# Patient Record
Sex: FEMALE | Race: WHITE | HISPANIC OR LATINO | Employment: UNEMPLOYED | URBAN - METROPOLITAN AREA
[De-identification: names, ages, dates, MRNs, and addresses within clinical notes are randomized per-mention and may not be internally consistent; named-entity substitution may affect disease eponyms.]

---

## 2022-05-16 ENCOUNTER — OFFICE VISIT (OUTPATIENT)
Dept: PRIMARY CARE CLINIC | Facility: CLINIC | Age: 57
End: 2022-05-16
Payer: COMMERCIAL

## 2022-05-16 VITALS
OXYGEN SATURATION: 98 % | HEIGHT: 62 IN | BODY MASS INDEX: 22.82 KG/M2 | HEART RATE: 73 BPM | RESPIRATION RATE: 18 BRPM | SYSTOLIC BLOOD PRESSURE: 112 MMHG | DIASTOLIC BLOOD PRESSURE: 70 MMHG | WEIGHT: 124 LBS

## 2022-05-16 DIAGNOSIS — Z01.419 WELL WOMAN EXAM WITH ROUTINE GYNECOLOGICAL EXAM: ICD-10-CM

## 2022-05-16 DIAGNOSIS — M25.59 PAIN IN OTHER JOINT: ICD-10-CM

## 2022-05-16 DIAGNOSIS — Z00.00 ANNUAL PHYSICAL EXAM: ICD-10-CM

## 2022-05-16 DIAGNOSIS — Z12.31 BREAST CANCER SCREENING BY MAMMOGRAM: ICD-10-CM

## 2022-05-16 DIAGNOSIS — R53.83 OTHER FATIGUE: ICD-10-CM

## 2022-05-16 DIAGNOSIS — M25.40 JOINT SWELLING: ICD-10-CM

## 2022-05-16 PROCEDURE — 1160F RVW MEDS BY RX/DR IN RCRD: CPT | Mod: CPTII,S$GLB,, | Performed by: NURSE PRACTITIONER

## 2022-05-16 PROCEDURE — 1159F PR MEDICATION LIST DOCUMENTED IN MEDICAL RECORD: ICD-10-PCS | Mod: CPTII,S$GLB,, | Performed by: NURSE PRACTITIONER

## 2022-05-16 PROCEDURE — 3078F DIAST BP <80 MM HG: CPT | Mod: CPTII,S$GLB,, | Performed by: NURSE PRACTITIONER

## 2022-05-16 PROCEDURE — 3078F PR MOST RECENT DIASTOLIC BLOOD PRESSURE < 80 MM HG: ICD-10-PCS | Mod: CPTII,S$GLB,, | Performed by: NURSE PRACTITIONER

## 2022-05-16 PROCEDURE — 3074F PR MOST RECENT SYSTOLIC BLOOD PRESSURE < 130 MM HG: ICD-10-PCS | Mod: CPTII,S$GLB,, | Performed by: NURSE PRACTITIONER

## 2022-05-16 PROCEDURE — 3074F SYST BP LT 130 MM HG: CPT | Mod: CPTII,S$GLB,, | Performed by: NURSE PRACTITIONER

## 2022-05-16 PROCEDURE — 1160F PR REVIEW ALL MEDS BY PRESCRIBER/CLIN PHARMACIST DOCUMENTED: ICD-10-PCS | Mod: CPTII,S$GLB,, | Performed by: NURSE PRACTITIONER

## 2022-05-16 PROCEDURE — 3008F PR BODY MASS INDEX (BMI) DOCUMENTED: ICD-10-PCS | Mod: CPTII,S$GLB,, | Performed by: NURSE PRACTITIONER

## 2022-05-16 PROCEDURE — 1159F MED LIST DOCD IN RCRD: CPT | Mod: CPTII,S$GLB,, | Performed by: NURSE PRACTITIONER

## 2022-05-16 PROCEDURE — 99204 PR OFFICE/OUTPT VISIT, NEW, LEVL IV, 45-59 MIN: ICD-10-PCS | Mod: S$GLB,,, | Performed by: NURSE PRACTITIONER

## 2022-05-16 PROCEDURE — 99204 OFFICE O/P NEW MOD 45 MIN: CPT | Mod: S$GLB,,, | Performed by: NURSE PRACTITIONER

## 2022-05-16 PROCEDURE — 3008F BODY MASS INDEX DOCD: CPT | Mod: CPTII,S$GLB,, | Performed by: NURSE PRACTITIONER

## 2022-05-16 RX ORDER — ATORVASTATIN CALCIUM 20 MG/1
20 TABLET, FILM COATED ORAL NIGHTLY
COMMUNITY
Start: 2022-03-10 | End: 2022-06-01 | Stop reason: SDUPTHER

## 2022-05-16 NOTE — PROGRESS NOTES
Subjective:       Patient ID: Shima Wood is a 56 y.o. female.    Chief Complaint: Establish Care    HPI: Shima is a 56 y.o. female  who presents to establish care with a new PCP. Previously seen by Dr. Demarco.     C/O being achy almost every morning. Aches to her hands, feet and hips. 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    History of Hyperlipidemia usually controlled with atorvastatin 20 mg daily. She has been trying to eat healthier but does not exercise.    Due for mammogram, last one done 2 years ago and was normal.    She has been in menopause for 6 years. Last PAP smear done 2 years ago by provider at [facility] and was normal.    Had a Colonoscopy done last year at [facility] and had some polyps removed and told to repeat in 3 years.    Denies smoking or drinking.     UTD with COVID vaccine.        Past Medical History:   Diagnosis Date    Hyperlipidemia        History reviewed. No pertinent surgical history.    History reviewed. No pertinent family history.    Social History     Tobacco Use    Smoking status: Never Smoker    Smokeless tobacco: Never Used       There is no problem list on file for this patient.      Immunization History   Administered Date(s) Administered    COVID-19, MRNA, LN-S, PF (MODERNA FULL 0.5 ML DOSE) 03/30/2021, 04/27/2021, 04/12/2022           Review of Systems   Constitutional: Positive for fatigue. Negative for activity change, appetite change, chills, diaphoresis and fever.   HENT: Negative for congestion, ear pain, sinus pain, tinnitus and trouble swallowing.    Eyes: Negative for pain and visual disturbance.   Respiratory: Negative for cough, chest tightness, shortness of breath and wheezing.    Cardiovascular: Negative for chest pain, palpitations and leg swelling.   Gastrointestinal: Negative for abdominal distention, abdominal pain, blood in stool, constipation, diarrhea, nausea and vomiting.   Endocrine: Negative for  "cold intolerance, heat intolerance, polydipsia, polyphagia and polyuria.   Genitourinary: Negative for decreased urine volume, dysuria, frequency, hematuria and urgency.   Musculoskeletal: Positive for arthralgias. Negative for back pain, gait problem, joint swelling, neck pain and neck stiffness.   Skin: Negative for color change, pallor and rash.   Neurological: Negative for dizziness, syncope, weakness, light-headedness, numbness and headaches.   Hematological: Negative for adenopathy. Does not bruise/bleed easily.   Psychiatric/Behavioral: Negative for behavioral problems, confusion and dysphoric mood. The patient is not nervous/anxious.      Objective:     Vitals:    05/16/22 1047   BP: 112/70   BP Location: Left arm   Patient Position: Sitting   BP Method: Large (Automatic)   Pulse: 73   Resp: 18   SpO2: 98%   Weight: 56.2 kg (124 lb)   Height: 5' 2" (1.575 m)       Physical Exam  Vitals and nursing note reviewed.   Constitutional:       General: She is not in acute distress.     Appearance: Normal appearance. She is not ill-appearing.   HENT:      Head: Normocephalic and atraumatic.      Nose: Nose normal.      Mouth/Throat:      Mouth: Mucous membranes are moist.      Pharynx: Oropharynx is clear.   Eyes:      Extraocular Movements: Extraocular movements intact.      Conjunctiva/sclera: Conjunctivae normal.      Pupils: Pupils are equal, round, and reactive to light.   Neck:      Thyroid: No thyromegaly or thyroid tenderness.   Cardiovascular:      Rate and Rhythm: Normal rate and regular rhythm.      Pulses: Normal pulses.      Heart sounds: Normal heart sounds.   Pulmonary:      Effort: Pulmonary effort is normal.      Breath sounds: Normal breath sounds. No stridor. No wheezing or rales.   Abdominal:      General: Bowel sounds are normal. There is no distension.      Palpations: Abdomen is soft.      Tenderness: There is no abdominal tenderness.   Musculoskeletal:         General: Tenderness (multiple " joints) present. Normal range of motion.      Cervical back: Normal range of motion and neck supple.      Right lower leg: No edema.      Left lower leg: No edema.   Lymphadenopathy:      Cervical: No cervical adenopathy.   Skin:     General: Skin is warm and dry.      Capillary Refill: Capillary refill takes less than 2 seconds.   Neurological:      General: No focal deficit present.      Mental Status: She is alert and oriented to person, place, and time.   Psychiatric:         Mood and Affect: Mood and affect normal.         Behavior: Behavior normal. Behavior is cooperative.         Thought Content: Thought content normal.         Judgment: Judgment normal.         No visits with results within 6 Month(s) from this visit.   Latest known visit with results is:   No results found for any previous visit.         Assessment:      1. Annual physical exam    2. Breast cancer screening by mammogram    3. Well woman exam with routine gynecological exam    4. Pain in other joint    5. Joint swelling    6. Other fatigue          Plan:     Annual physical exam  Comments:  Will review labs and determine POC based  Orders:  -     Comprehensive Metabolic Panel; Future; Expected date: 05/16/2022  -     CBC auto differential; Future; Expected date: 05/16/2022  -     TSH; Future; Expected date: 05/16/2022  -     Lipid Panel; Future; Expected date: 05/16/2022    Breast cancer screening by mammogram  -     Mammo Digital Screening Bilat; Future; Expected date: 05/16/2022    Well woman exam with routine gynecological exam  -     Ambulatory referral/consult to Obstetrics / Gynecology; Future; Expected date: 05/23/2022    Pain in other joint  -     BIENVENIDO Screen w/Reflex; Future; Expected date: 05/16/2022  -     Rheumatoid Factor; Future; Expected date: 05/16/2022    Joint swelling  -     BIENVENIDO Screen w/Reflex; Future; Expected date: 05/16/2022  -     Rheumatoid Factor; Future; Expected date: 05/16/2022    Other fatigue  -     BIENVENIDO Screen  w/Reflex; Future; Expected date: 05/16/2022  -     Rheumatoid Factor; Future; Expected date: 05/16/2022         Current Outpatient Medications   Medication Sig Dispense Refill    atorvastatin (LIPITOR) 20 MG tablet Take 20 mg by mouth every evening.       No current facility-administered medications for this visit.       There are no discontinued medications.    Health Maintenance   Topic Date Due    Hepatitis C Screening  Never done    Lipid Panel  Never done    TETANUS VACCINE  Never done    Mammogram  Never done       Patient Instructions   RTC in 2 weeks for F/U or sooner if needed.    Keep appts with specialists as scheduled.    Instructed patient to report to nearest ER or call 911 if begins to have difficulty breathing, turning blue, chest pain, B/P < 80/60 or >170/100, palpitations, syncope, extreme weakness, or severe H/A. Patient verbalized understanding.      Patient Education       Low Cholesterol, Saturated Fat, and Trans Fat Diet   About this topic   Cholesterol, saturated fat, and trans fat are in many foods. These may raise your blood cholesterol levels. If your cholesterol is too high, this can cause health problems in your heart, liver, kidneys, and even your eyes. The key to lowering your risk of heart problems is to lower your bad fat intake.  Saturated fats and trans fats are the bad fats. These fats clog your arteries and raise your bad cholesterol. Saturated fats and trans fats are solid fats at room temperature. Saturated fats are animal fats. Trans fats are manmade fats. They add flavor to a lot of packaged foods. Staying away from saturated and trans fats will help your heart.  When you do eat foods with fat, make sure they have the good fats. Monounsaturated and polyunsaturated fats are good fats. These fats help raise your good cholesterol and protect your heart.  General   How to Lower Fat and Cholesterol in Your Diet   · Read the labels of the foods you buy from the market to find  "out how much fat is present. Under 5% of total fat on a label means it is "low fat". Over 20% of total fat on a label means it is high fat.  · Eat high fiber foods, like soluble fiber. This type of fiber helps lower cholesterol in the body. Choose oatmeal, fruits (like apples), beans, and nuts to get the most soluble fiber.  · Eat foods high in omega-3 fatty acids like toshia seeds, walnuts, salmon, tuna, trout, herring, flaxseed, and soybeans. These foods help keep the heart healthy.  · Limit your bad fat and oil intake.  · Stay away from butter, stick margarine, shortening, lard, and palm and coconut oil. Pick plant-based spreads instead.  · Limit mayonnaise, salad dressings, gravies, and sauces, unless it is made from low-fat ingredients.  · Limit chocolate.  · Do not eat high-fat processed foods like hot dogs, newell, sausage, ham and other luncheon meats high in fat, and some frozen foods. Pick fish, chicken, turkey, and lean meats instead.  · Eat more dried beans, lentils, and tofu to get your protein.  · Do not eat organ meats, like liver.  · Choose nonfat or low-fat milk, yogurt, and cheese.  · Use light or fat-free cream cheese and sour cream.  · Eat lots of fruits and vegetables.  · Pick whole grain breads, cereals, pastas, and rice.  · Do not eat snacks that are high in fats like granola, cookies, pies, pastries, doughnuts, and croissants.  · Stay away from deep fried foods.  Help When Cooking   · Remove the fat portion of meats and the skin from poultry before cooking.  · Bake, broil, grill, poach, or roast poultry, fish, and lean meats.  · Drain and throw away the fat that drains out of meat as you cook it.  · Try to add little or no fat to foods.  · Use olive or canola oil for cooking or baking.  · Steam your vegetables.  · Use herbs or no-oil marinades to flavor foods.         Who should use this diet?   This diet is for people who are at high risk of getting health problems like heart disease, high " blood pressure, diabetes, and others. This diet is also good for all people to follow to keep your heart healthy.  What foods are good to eat?   Foods with good fats are:  · Canola, peanut, and olive oil  · Safflower, soybean, and corn oil  · Walnuts, almonds, cashews, and peanuts  · Pumpkin and sunflower seeds  · [place] and tuna  · Tofu  · Soymilk  · Avocado  What foods should be limited or avoided?   Stay away from these types of foods that have saturated fats:  · Whole fat dairy products like cheese, ice cream, whole milk, and cream  · Palm and coconut oils  · High-fat meats like beef, lamb, poultry with the skin, newell, and sausage  · Butter and lard  Stay away from these types of foods that may have trans fat:  · Cookies, cakes, candy, doughnuts, baked goods, muffins, pizza dough, and pie crusts that are packaged  · Fried foods  · Frozen dinners  · Chips and crackers  · Microwave popcorn  · Stick margarine and vegetable shortenings  Helpful tips   To help stay away from saturated fat:  · Pick lean cuts of meat  · Take the skin off chicken and turkey or pick skinless  · Pick low-fat cheese, milk, and ice cream  · Use liquid oils when cooking and baking, such as olive oil and canola oil  To help stay away from trans fat:  · Look at your labels. Choose foods with 0% trans fat. Read the ingredient list. Avoid foods with partially hydrogenated oil in the ingredient list. This means there is trans fat in the product.  Where can I learn more?   American Heart Association   http://www.heart.org/HEARTORG/Conditions/Cholesterol/PreventionTreatmentofHighCholesterol/Know-Your-Fats_[facility]_305628_Article.jsp   Last Reviewed Date   2021-10-05  Consumer Information Use and Disclaimer   This information is not specific medical advice and does not replace information you receive from your health care provider. This is only a brief summary of general information. It does NOT include all information about conditions, illnesses,  injuries, tests, procedures, treatments, therapies, discharge instructions or life-style choices that may apply to you. You must talk with your health care provider for complete information about your health and treatment options. This information should not be used to decide whether or not to accept your health care providers advice, instructions or recommendations. Only your health care provider has the knowledge and training to provide advice that is right for you.   Copyright   Copyright © 2021 UpToDate, Inc. and its affiliates and/or licensors. All rights reserved.  Patient Education       Yearly Physical for Adults   About this topic   Most people do not want to be sick. Having a checkup each year with your doctor is one way to help you stay healthy. You may need to see your doctor more or less often. How often you need to go to the doctor depends on your age. Your family and medical history also play a role in how often you need to go to the doctor. Going to see your doctor on a routine basis can help you find problems early or even before they start. This may make it easier to treat or cure your problem.  General   Your doctor will talk about many things during your checkup. Your doctor may ask about:  · Your medical and family history.  · All the drugs you are taking. Be sure to include all prescription, over the counter, and herbal supplements. Tell the doctor if you have any drug allergy. Bring a list of drugs you take with you.  · How you are feeling and if you are having any problems.  · Risky behaviors like smoking, drinking alcohol, using illegal drugs, not wearing seatbelts, having unprotected sex, etc.  Your doctor will do a physical exam and may check your:  · Height and weight  · Blood pressure  · Reflexes  · Memory  · Vision  · Hearing  Your doctor may order:  · Lab tests  · ECG to check your heart rhythm  · X-rays  · Tests or treatments based on your exam  What lifestyle changes are needed?    Your doctor may suggest you make changes to your lifestyle at this visit. The doctor may talk with you about being more active or lowering stress levels. Ask your doctor what you need to do.  What drugs may be needed?   Your doctor may order drugs or vaccines to protect you from illnesses.  What changes to diet are needed?   Talk to your doctor to see if any changes are needed to your diet.  When do I need to call the doctor?   Call your doctor if you need to learn about any test results. Together you can make a plan for more care.  Helpful tips   · Make a list of questions for your doctor before you go. This will help you remember to ask about any concerns. Write down any answers from your doctor so you can look over them after your visit.   · Tell your doctor about any changes in your body or health since your last visit.  · Ask your doctor about any screening tests you need.  Where can I learn more?   American Academy of [employer]icians  http://familydoctor.org/familydoctor/en/prevention-wellness/staying-healthy/healthy-living/preventive-services-for-healthy-living.printerview.html   Centers for Disease Control  http://www.cdc.gov/family/checkup/   Last Reviewed Date   2019-04-22  Consumer Information Use and Disclaimer   This information is not specific medical advice and does not replace information you receive from your health care provider. This is only a brief summary of general information. It does NOT include all information about conditions, illnesses, injuries, tests, procedures, treatments, therapies, discharge instructions or life-style choices that may apply to you. You must talk with your health care provider for complete information about your health and treatment options. This information should not be used to decide whether or not to accept your health care providers advice, instructions or recommendations. Only your health care provider has the knowledge and training to provide advice that  is right for you.  Copyright   Copyright © 2021 [employer], [employer]. and its affiliates and/or licensors. All rights reserved.        Risks, benefits, and alternatives discussed with patient, Patient verbalized understanding of discussed plan of care. Asked patient if any further questions, answered no.    Future Appointments   Date Time Provider Department Center   6/1/2022 11:40 AM Yenifer Stein NP [facility]              Yenifer Stein NP

## 2022-05-16 NOTE — PATIENT INSTRUCTIONS
"RTC in 2 weeks for F/U or sooner if needed.    Keep appts with specialists as scheduled.    Instructed patient to report to nearest ER or call 911 if begins to have difficulty breathing, turning blue, chest pain, B/P < 80/60 or >170/100, palpitations, syncope, extreme weakness, or severe H/A. Patient verbalized understanding.      Patient Education       Low Cholesterol, Saturated Fat, and Trans Fat Diet   About this topic   Cholesterol, saturated fat, and trans fat are in many foods. These may raise your blood cholesterol levels. If your cholesterol is too high, this can cause health problems in your heart, liver, kidneys, and even your eyes. The key to lowering your risk of heart problems is to lower your bad fat intake.  Saturated fats and trans fats are the bad fats. These fats clog your arteries and raise your bad cholesterol. Saturated fats and trans fats are solid fats at room temperature. Saturated fats are animal fats. Trans fats are manmade fats. They add flavor to a lot of packaged foods. Staying away from saturated and trans fats will help your heart.  When you do eat foods with fat, make sure they have the good fats. Monounsaturated and polyunsaturated fats are good fats. These fats help raise your good cholesterol and protect your heart.  General   How to Lower Fat and Cholesterol in Your Diet   Read the labels of the foods you buy from the market to find out how much fat is present. Under 5% of total fat on a label means it is "low fat". Over 20% of total fat on a label means it is high fat.  Eat high fiber foods, like soluble fiber. This type of fiber helps lower cholesterol in the body. Choose oatmeal, fruits (like apples), beans, and nuts to get the most soluble fiber.  Eat foods high in omega-3 fatty acids like toshia seeds, walnuts, salmon, tuna, trout, herring, flaxseed, and soybeans. These foods help keep the heart healthy.  Limit your bad fat and oil intake.  Stay away from butter, stick " margarine, shortening, lard, and palm and coconut oil. Pick plant-based spreads instead.  Limit mayonnaise, salad dressings, gravies, and sauces, unless it is made from low-fat ingredients.  Limit chocolate.  Do not eat high-fat processed foods like hot dogs, newell, sausage, ham and other luncheon meats high in fat, and some frozen foods. Pick fish, chicken, turkey, and lean meats instead.  Eat more dried beans, lentils, and tofu to get your protein.  Do not eat organ meats, like liver.  Choose nonfat or low-fat milk, yogurt, and cheese.  Use light or fat-free cream cheese and sour cream.  Eat lots of fruits and vegetables.  Pick whole grain breads, cereals, pastas, and rice.  Do not eat snacks that are high in fats like granola, cookies, pies, pastries, doughnuts, and croissants.  Stay away from deep fried foods.  Help When Cooking   Remove the fat portion of meats and the skin from poultry before cooking.  Bake, broil, grill, poach, or roast poultry, fish, and lean meats.  Drain and throw away the fat that drains out of meat as you cook it.  Try to add little or no fat to foods.  Use olive or canola oil for cooking or baking.  Steam your vegetables.  Use herbs or no-oil marinades to flavor foods.         Who should use this diet?   This diet is for people who are at high risk of getting health problems like heart disease, high blood pressure, diabetes, and others. This diet is also good for all people to follow to keep your heart healthy.  What foods are good to eat?   Foods with good fats are:  Canola, peanut, and olive oil  Safflower, soybean, and corn oil  Walnuts, almonds, cashews, and peanuts  Pumpkin and sunflower seeds  [place] and tuna  Tofu  Soymilk  Avocado  What foods should be limited or avoided?   Stay away from these types of foods that have saturated fats:  Whole fat dairy products like cheese, ice cream, whole milk, and cream  Palm and coconut oils  High-fat meats like beef, lamb, poultry with the  skin, newell, and sausage  Butter and lard  Stay away from these types of foods that may have trans fat:  Cookies, cakes, candy, doughnuts, baked goods, muffins, pizza dough, and pie crusts that are packaged  Fried foods  Frozen dinners  Chips and crackers  Microwave popcorn  Stick margarine and vegetable shortenings  Helpful tips   To help stay away from saturated fat:  Pick lean cuts of meat  Take the skin off chicken and turkey or pick skinless  Pick low-fat cheese, milk, and ice cream  Use liquid oils when cooking and baking, such as olive oil and canola oil  To help stay away from trans fat:  Look at your labels. Choose foods with 0% trans fat. Read the ingredient list. Avoid foods with partially hydrogenated oil in the ingredient list. This means there is trans fat in the product.  Where can I learn more?   American Heart Association   http://www.heart.org/HEARTORG/Conditions/Cholesterol/PreventionTreatmentofHighCholesterol/Know-Your-Fats_[email or web address]_305628_Article.jsp   Last Reviewed Date   2021-10-05  Consumer Information Use and Disclaimer   This information is not specific medical advice and does not replace information you receive from your health care provider. This is only a brief summary of general information. It does NOT include all information about conditions, illnesses, injuries, tests, procedures, treatments, therapies, discharge instructions or life-style choices that may apply to you. You must talk with your health care provider for complete information about your health and treatment options. This information should not be used to decide whether or not to accept your health care providers advice, instructions or recommendations. Only your health care provider has the knowledge and training to provide advice that is right for you.   Copyright   Copyright © 2021 [employer] and its affiliates and/or licensors. All rights reserved.  Patient Education       Yearly Physical for Adults   About this topic    Most people do not want to be sick. Having a checkup each year with your doctor is one way to help you stay healthy. You may need to see your doctor more or less often. How often you need to go to the doctor depends on your age. Your family and medical history also play a role in how often you need to go to the doctor. Going to see your doctor on a routine basis can help you find problems early or even before they start. This may make it easier to treat or cure your problem.  General   Your doctor will talk about many things during your checkup. Your doctor may ask about:  Your medical and family history.  All the drugs you are taking. Be sure to include all prescription, over the counter, and herbal supplements. Tell the doctor if you have any drug allergy. Bring a list of drugs you take with you.  How you are feeling and if you are having any problems.  Risky behaviors like smoking, drinking alcohol, using illegal drugs, not wearing seatbelts, having unprotected sex, etc.  Your doctor will do a physical exam and may check your:  Height and weight  Blood pressure  Reflexes  Memory  Vision  Hearing  Your doctor may order:  Lab tests  ECG to check your heart rhythm  X-rays  Tests or treatments based on your exam  What lifestyle changes are needed?   Your doctor may suggest you make changes to your lifestyle at this visit. The doctor may talk with you about being more active or lowering stress levels. Ask your doctor what you need to do.  What drugs may be needed?   Your doctor may order drugs or vaccines to protect you from illnesses.  What changes to diet are needed?   Talk to your doctor to see if any changes are needed to your diet.  When do I need to call the doctor?   Call your doctor if you need to learn about any test results. Together you can make a plan for more care.  Helpful tips   Make a list of questions for your doctor before you go. This will help you remember to ask about any concerns. Write down  any answers from your doctor so you can look over them after your visit.   Tell your doctor about any changes in your body or health since your last visit.  Ask your doctor about any screening tests you need.  Where can I learn more?   American Academy [employer]  http://familydoctor.org/familydoctor/en/prevention-wellness/staying-healthy/healthy-living/preventive-services-for-healthy-living.printerview.html   Centers for Disease Control  http://www.cdc.gov/family/checkup/   Last Reviewed Date   2019-04-22  Consumer Information Use and Disclaimer   This information is not specific medical advice and does not replace information you receive from your health care provider. This is only a brief summary of general information. It does NOT include all information about conditions, illnesses, injuries, tests, procedures, treatments, therapies, discharge instructions or life-style choices that may apply to you. You must talk with your health care provider for complete information about your health and treatment options. This information should not be used to decide whether or not to accept your health care providers advice, instructions or recommendations. Only your health care provider has the knowledge and training to provide advice that is right for you.  Copyright   Copyright © 2021 [employer] and its affiliates and/or licensors. All rights reserved.

## 2022-05-17 ENCOUNTER — PATIENT MESSAGE (OUTPATIENT)
Dept: ADMINISTRATIVE | Facility: HOSPITAL | Age: 57
End: 2022-05-17
Payer: COMMERCIAL

## 2022-05-17 LAB
ALBUMIN SERPL-MCNC: 4.6 G/DL (ref 3.6–5.1)
ALBUMIN/GLOB SERPL: 1.8 (CALC) (ref 1–2.5)
ALP SERPL-CCNC: 76 U/L (ref 37–153)
ALT SERPL-CCNC: 29 U/L (ref 6–29)
AST SERPL-CCNC: 28 U/L (ref 10–35)
BASOPHILS # BLD AUTO: 48 CELLS/UL (ref 0–200)
BASOPHILS NFR BLD AUTO: 1.1 %
BILIRUB SERPL-MCNC: 0.6 MG/DL (ref 0.2–1.2)
BUN SERPL-MCNC: 13 MG/DL (ref 7–25)
BUN/CREAT SERPL: NORMAL (CALC) (ref 6–22)
CALCIUM SERPL-MCNC: 9.7 MG/DL (ref 8.6–10.4)
CHLORIDE SERPL-SCNC: 102 MMOL/L (ref 98–110)
CHOLEST SERPL-MCNC: 206 MG/DL
CHOLEST/HDLC SERPL: 3.1 (CALC)
CO2 SERPL-SCNC: 31 MMOL/L (ref 20–32)
CREAT SERPL-MCNC: 0.63 MG/DL (ref 0.5–1.05)
EOSINOPHIL # BLD AUTO: 62 CELLS/UL (ref 15–500)
EOSINOPHIL NFR BLD AUTO: 1.4 %
ERYTHROCYTE [DISTWIDTH] IN BLOOD BY AUTOMATED COUNT: 12.5 % (ref 11–15)
GLOBULIN SER CALC-MCNC: 2.6 G/DL (CALC) (ref 1.9–3.7)
GLUCOSE SERPL-MCNC: 85 MG/DL (ref 65–99)
HCT VFR BLD AUTO: 42 % (ref 35–45)
HDLC SERPL-MCNC: 66 MG/DL
HGB BLD-MCNC: 13.9 G/DL (ref 11.7–15.5)
LDLC SERPL CALC-MCNC: 118 MG/DL (CALC)
LYMPHOCYTES # BLD AUTO: 1971 CELLS/UL (ref 850–3900)
LYMPHOCYTES NFR BLD AUTO: 44.8 %
MCH RBC QN AUTO: 31.2 PG (ref 27–33)
MCHC RBC AUTO-ENTMCNC: 33.1 G/DL (ref 32–36)
MCV RBC AUTO: 94.4 FL (ref 80–100)
MONOCYTES # BLD AUTO: 378 CELLS/UL (ref 200–950)
MONOCYTES NFR BLD AUTO: 8.6 %
NEUTROPHILS # BLD AUTO: 1940 CELLS/UL (ref 1500–7800)
NEUTROPHILS NFR BLD AUTO: 44.1 %
NONHDLC SERPL-MCNC: 140 MG/DL (CALC)
PLATELET # BLD AUTO: 290 THOUSAND/UL (ref 140–400)
PMV BLD REES-ECKER: 9.7 FL (ref 7.5–12.5)
POTASSIUM SERPL-SCNC: 4.1 MMOL/L (ref 3.5–5.3)
PROT SERPL-MCNC: 7.2 G/DL (ref 6.1–8.1)
RBC # BLD AUTO: 4.45 MILLION/UL (ref 3.8–5.1)
SODIUM SERPL-SCNC: 140 MMOL/L (ref 135–146)
TRIGL SERPL-MCNC: 117 MG/DL
TSH SERPL-ACNC: 1.54 MIU/L
WBC # BLD AUTO: 4.4 THOUSAND/UL (ref 3.8–10.8)

## 2022-05-18 LAB
ANA SER QL IF: NEGATIVE
RHEUMATOID FACT SERPL-ACNC: <14 IU/ML

## 2022-05-18 NOTE — PROGRESS NOTES
Please notify patient her autoimmune testing came back negative. Her cholesterol is a little elevated, I recommend eating a healthy diet with more veggies, lean proteins, fruit, whole grains, less fried, fatty foods, less fast foods. Drink more water and exercise at least 30min. Per day 3-4 days per week. Brisk walking would be a great start. Also, be sure to take her cholesterol medication every evening. We will continue to monitor this. All of her other labs are normal.

## 2022-05-19 ENCOUNTER — TELEPHONE (OUTPATIENT)
Dept: PRIMARY CARE CLINIC | Facility: CLINIC | Age: 57
End: 2022-05-19
Payer: COMMERCIAL

## 2022-05-19 NOTE — TELEPHONE ENCOUNTER
----- Message from Bertha Stein NP sent at 5/18/2022  6:33 PM CDT -----  Please notify patient her autoimmune testing came back negative. Her cholesterol is a little elevated, I recommend eating a healthy diet with more veggies, lean proteins, fruit, whole grains, less fried, fatty foods, less fast foods. Drink more water and exercise at least 30min. Per day 3-4 days per week. Brisk walking would be a great start. Also, be sure to take her cholesterol medication every evening. We will continue to monitor this. All of her other labs are normal.

## 2022-06-01 ENCOUNTER — OFFICE VISIT (OUTPATIENT)
Dept: PRIMARY CARE CLINIC | Facility: CLINIC | Age: 57
End: 2022-06-01
Payer: COMMERCIAL

## 2022-06-01 VITALS
RESPIRATION RATE: 18 BRPM | HEART RATE: 72 BPM | OXYGEN SATURATION: 98 % | DIASTOLIC BLOOD PRESSURE: 64 MMHG | HEIGHT: 62 IN | BODY MASS INDEX: 24.66 KG/M2 | SYSTOLIC BLOOD PRESSURE: 108 MMHG | WEIGHT: 134 LBS

## 2022-06-01 DIAGNOSIS — E78.00 PURE HYPERCHOLESTEROLEMIA: ICD-10-CM

## 2022-06-01 DIAGNOSIS — L08.9 SKIN INFLAMMATION: ICD-10-CM

## 2022-06-01 DIAGNOSIS — J30.2 SEASONAL ALLERGIES: Primary | ICD-10-CM

## 2022-06-01 PROCEDURE — 3008F BODY MASS INDEX DOCD: CPT | Mod: CPTII,S$GLB,, | Performed by: NURSE PRACTITIONER

## 2022-06-01 PROCEDURE — 3078F DIAST BP <80 MM HG: CPT | Mod: CPTII,S$GLB,, | Performed by: NURSE PRACTITIONER

## 2022-06-01 PROCEDURE — 3078F PR MOST RECENT DIASTOLIC BLOOD PRESSURE < 80 MM HG: ICD-10-PCS | Mod: CPTII,S$GLB,, | Performed by: NURSE PRACTITIONER

## 2022-06-01 PROCEDURE — 1159F MED LIST DOCD IN RCRD: CPT | Mod: CPTII,S$GLB,, | Performed by: NURSE PRACTITIONER

## 2022-06-01 PROCEDURE — 1159F PR MEDICATION LIST DOCUMENTED IN MEDICAL RECORD: ICD-10-PCS | Mod: CPTII,S$GLB,, | Performed by: NURSE PRACTITIONER

## 2022-06-01 PROCEDURE — 3074F PR MOST RECENT SYSTOLIC BLOOD PRESSURE < 130 MM HG: ICD-10-PCS | Mod: CPTII,S$GLB,, | Performed by: NURSE PRACTITIONER

## 2022-06-01 PROCEDURE — 99214 PR OFFICE/OUTPT VISIT, EST, LEVL IV, 30-39 MIN: ICD-10-PCS | Mod: S$GLB,,, | Performed by: NURSE PRACTITIONER

## 2022-06-01 PROCEDURE — 3008F PR BODY MASS INDEX (BMI) DOCUMENTED: ICD-10-PCS | Mod: CPTII,S$GLB,, | Performed by: NURSE PRACTITIONER

## 2022-06-01 PROCEDURE — 1160F PR REVIEW ALL MEDS BY PRESCRIBER/CLIN PHARMACIST DOCUMENTED: ICD-10-PCS | Mod: CPTII,S$GLB,, | Performed by: NURSE PRACTITIONER

## 2022-06-01 PROCEDURE — 99214 OFFICE O/P EST MOD 30 MIN: CPT | Mod: S$GLB,,, | Performed by: NURSE PRACTITIONER

## 2022-06-01 PROCEDURE — 1160F RVW MEDS BY RX/DR IN RCRD: CPT | Mod: CPTII,S$GLB,, | Performed by: NURSE PRACTITIONER

## 2022-06-01 PROCEDURE — 3074F SYST BP LT 130 MM HG: CPT | Mod: CPTII,S$GLB,, | Performed by: NURSE PRACTITIONER

## 2022-06-01 RX ORDER — DESLORATADINE 5 MG/1
5 TABLET ORAL DAILY
Qty: 30 TABLET | Refills: 2 | Status: SHIPPED | OUTPATIENT
Start: 2022-06-01 | End: 2023-04-04

## 2022-06-01 RX ORDER — METHYLPREDNISOLONE 4 MG/1
TABLET ORAL
Qty: 1 EACH | Refills: 0 | Status: SHIPPED | OUTPATIENT
Start: 2022-06-01 | End: 2022-09-29

## 2022-06-01 RX ORDER — ATORVASTATIN CALCIUM 20 MG/1
20 TABLET, FILM COATED ORAL NIGHTLY
Qty: 30 TABLET | Refills: 11 | Status: SHIPPED | OUTPATIENT
Start: 2022-06-01 | End: 2022-09-29 | Stop reason: SDUPTHER

## 2022-06-01 NOTE — PROGRESS NOTES
Subjective:       Patient ID: Shima Wood is a 56 y.o. female.    Chief Complaint: Follow-up    HPI: Shima is a 56 y.o. female  who presents for F/U and to discuss lab results.     C/O nasal congestion and stuffiness due to her seasonal allergies and she would like a medication to help with her flare up. Denies fever or cough.    Cholesterol elevated on recent labs. She was previously taking her cholesterol medication but had stopped after having her cholesterol checked while in [place] and was told her cholesterol was normal. Stressed importance of her starting back taking her cholesterol medication. States understanding.    Hyperlipidemia usually controlled with atorvastatin 20 mg daily. She has been trying to eat healthier but does not exercise.    Mammogram ordered but has not received a call yet, will provide the number today.     She has been in menopause for 6 years. Last PAP smear done 2 years ago by provider at [facility] and was normal. Referred to Dr. Delvalle for well woman and is scheduled in August.    Had a Colonoscopy done last year at [facility] and had some polyps removed and told to repeat in 3 years.    Denies smoking or drinking.             Past Medical History:   Diagnosis Date    Hyperlipidemia        History reviewed. No pertinent surgical history.    History reviewed. No pertinent family history.    Social History     Tobacco Use    Smoking status: Never Smoker    Smokeless tobacco: Never Used       Patient Active Problem List   Diagnosis    Pure hypercholesterolemia    Seasonal allergies       Immunization History   Administered Date(s) Administered    COVID-19, MRNA, LN-S, PF (MODERNA FULL 0.5 ML DOSE) 03/30/2021, 04/27/2021, 04/12/2022           Review of Systems   Constitutional: Negative for activity change, appetite change, chills, diaphoresis, fatigue and fever.   HENT: Positive for congestion, postnasal drip and rhinorrhea. Negative for ear pain, sinus pressure, sinus pain, sore  "throat, tinnitus and trouble swallowing.    Eyes: Negative for visual disturbance.   Respiratory: Negative for cough, chest tightness, shortness of breath and wheezing.    Cardiovascular: Negative for chest pain, palpitations and leg swelling.   Gastrointestinal: Negative for abdominal distention and abdominal pain.   Genitourinary: Negative for dysuria, frequency, hematuria and urgency.   Musculoskeletal: Negative for gait problem, neck pain and neck stiffness.   Skin: Positive for rash. Negative for color change.   Allergic/Immunologic: Positive for environmental allergies.   Neurological: Negative for dizziness, syncope, weakness, light-headedness, numbness and headaches.   Psychiatric/Behavioral: Negative for behavioral problems and confusion.     Objective:     Vitals:    06/01/22 1145   BP: 108/64   BP Location: Left arm   Patient Position: Sitting   BP Method: Large (Automatic)   Pulse: 72   Resp: 18   SpO2: 98%   Weight: 60.8 kg (134 lb)   Height: 5' 2" (1.575 m)       Physical Exam  Vitals and nursing note reviewed.   Constitutional:       General: She is not in acute distress.     Appearance: Normal appearance. She is not diaphoretic.   HENT:      Head: Normocephalic and atraumatic.      Nose: Congestion and rhinorrhea present.      Mouth/Throat:      Mouth: Mucous membranes are moist.      Pharynx: Oropharynx is clear. No posterior oropharyngeal erythema.   Eyes:      Extraocular Movements: Extraocular movements intact.      Conjunctiva/sclera: Conjunctivae normal.      Pupils: Pupils are equal, round, and reactive to light.   Cardiovascular:      Rate and Rhythm: Normal rate and regular rhythm.      Heart sounds: Normal heart sounds.   Pulmonary:      Effort: Pulmonary effort is normal.      Breath sounds: Normal breath sounds.   Musculoskeletal:         General: No tenderness. Normal range of motion.      Cervical back: Normal range of motion and neck supple.      Right lower leg: No edema.      Left " lower leg: No edema.   Skin:     General: Skin is warm and dry.      Capillary Refill: Capillary refill takes less than 2 seconds.   Neurological:      Mental Status: She is alert and oriented to person, place, and time.   Psychiatric:         Mood and Affect: Mood and affect normal.         Behavior: Behavior normal. Behavior is cooperative.         Office Visit on 05/16/2022   Component Date Value Ref Range Status    Glucose 05/16/2022 85  65 - 99 mg/dL Final    BUN 05/16/2022 13  7 - 25 mg/dL Final    Creatinine 05/16/2022 0.63  0.50 - 1.05 mg/dL Final    eGFR if non African American 05/16/2022 102  > OR = 60 mL/min/1.73m2 Final    eGFR if  05/16/2022 118  > OR = 60 mL/min/1.73m2 Final    BUN/Creatinine Ratio 05/16/2022 NOT APPLICABLE  6 - 22 (calc) Final    Sodium 05/16/2022 140  135 - 146 mmol/L Final    Potassium 05/16/2022 4.1  3.5 - 5.3 mmol/L Final    Chloride 05/16/2022 102  98 - 110 mmol/L Final    CO2 05/16/2022 31  20 - 32 mmol/L Final    Calcium 05/16/2022 9.7  8.6 - 10.4 mg/dL Final    Total Protein 05/16/2022 7.2  6.1 - 8.1 g/dL Final    Albumin 05/16/2022 4.6  3.6 - 5.1 g/dL Final    Globulin, Total 05/16/2022 2.6  1.9 - 3.7 g/dL (calc) Final    Albumin/Globulin Ratio 05/16/2022 1.8  1.0 - 2.5 (calc) Final    Total Bilirubin 05/16/2022 0.6  0.2 - 1.2 mg/dL Final    Alkaline Phosphatase 05/16/2022 76  37 - 153 U/L Final    AST 05/16/2022 28  10 - 35 U/L Final    ALT 05/16/2022 29  6 - 29 U/L Final    WBC 05/16/2022 4.4  3.8 - 10.8 Thousand/uL Final    RBC 05/16/2022 4.45  3.80 - 5.10 Million/uL Final    Hemoglobin 05/16/2022 13.9  11.7 - 15.5 g/dL Final    Hematocrit 05/16/2022 42.0  35.0 - 45.0 % Final    MCV 05/16/2022 94.4  80.0 - 100.0 fL Final    MCH 05/16/2022 31.2  27.0 - 33.0 pg Final    MCHC 05/16/2022 33.1  32.0 - 36.0 g/dL Final    RDW 05/16/2022 12.5  11.0 - 15.0 % Final    Platelets 05/16/2022 290  140 - 400 Thousand/uL Final    MPV 05/16/2022  9.7  7.5 - 12.5 fL Final    Neutrophils, Abs 05/16/2022 1,940  1,500 - 7,800 cells/uL Final    Lymph # 05/16/2022 1,971  850 - 3,900 cells/uL Final    Mono # 05/16/2022 378  200 - 950 cells/uL Final    Eos # 05/16/2022 62  15 - 500 cells/uL Final    Baso # 05/16/2022 48  0 - 200 cells/uL Final    Neutrophils Relative 05/16/2022 44.1  % Final    Lymph % 05/16/2022 44.8  % Final    Mono % 05/16/2022 8.6  % Final    Eosinophil % 05/16/2022 1.4  % Final    Basophil % 05/16/2022 1.1  % Final    TSH 05/16/2022 1.54  mIU/L Final    Cholesterol 05/16/2022 206 (A) <200 mg/dL Final    HDL 05/16/2022 66  > OR = 50 mg/dL Final    Triglycerides 05/16/2022 117  <150 mg/dL Final    LDL Cholesterol 05/16/2022 118 (A) mg/dL (calc) Final    HDL/Cholesterol Ratio 05/16/2022 3.1  <5.0 (calc) Final    Non HDL Chol. (LDL+VLDL) 05/16/2022 140 (A) <130 mg/dL (calc) Final    BIENVENIDO 05/16/2022 NEGATIVE  NEGATIVE Final    Rheumatoid Factor 05/16/2022 <14  <14 IU/mL Final         Assessment:      1. Seasonal allergies    2. Skin inflammation    3. Pure hypercholesterolemia          Plan:     Seasonal allergies  -     desloratadine (CLARINEX) 5 mg tablet; Take 1 tablet (5 mg total) by mouth once daily.  Dispense: 30 tablet; Refill: 2    Skin inflammation  -     methylPREDNISolone (MEDROL DOSEPACK) 4 mg tablet; use as directed  Dispense: 1 each; Refill: 0    Pure hypercholesterolemia  Comments:  Continue STATIN daily, eat low cholesterol diet and exercise regularly  Orders:  -     atorvastatin (LIPITOR) 20 MG tablet; Take 1 tablet (20 mg total) by mouth every evening.  Dispense: 30 tablet; Refill: 11         Current Outpatient Medications   Medication Sig Dispense Refill    atorvastatin (LIPITOR) 20 MG tablet Take 1 tablet (20 mg total) by mouth every evening. 30 tablet 11    desloratadine (CLARINEX) 5 mg tablet Take 1 tablet (5 mg total) by mouth once daily. 30 tablet 2    methylPREDNISolone (MEDROL DOSEPACK) 4 mg tablet use  "as directed 1 each 0     No current facility-administered medications for this visit.       Medications Discontinued During This Encounter   Medication Reason    atorvastatin (LIPITOR) 20 MG tablet Reorder       Health Maintenance   Topic Date Due    Hepatitis C Screening  Never done    TETANUS VACCINE  Never done    Mammogram  Never done    Lipid Panel  05/16/2027       Patient Instructions   RTC in 3 months for F/U or sooner if needed.    Keep appts with specialists as scheduled.    Instructed patient to report to nearest ER or call 911 if begins to have difficulty breathing, turning blue, chest pain, B/P < 80/60 or >170/100, palpitations, syncope, extreme weakness, or severe H/A. Patient verbalized understanding.      Patient Education       Low Cholesterol, Saturated Fat, and Trans Fat Diet   About this topic   Cholesterol, saturated fat, and trans fat are in many foods. These may raise your blood cholesterol levels. If your cholesterol is too high, this can cause health problems in your heart, liver, kidneys, and even your eyes. The key to lowering your risk of heart problems is to lower your bad fat intake.  Saturated fats and trans fats are the bad fats. These fats clog your arteries and raise your bad cholesterol. Saturated fats and trans fats are solid fats at room temperature. Saturated fats are animal fats. Trans fats are manmade fats. They add flavor to a lot of packaged foods. Staying away from saturated and trans fats will help your heart.  When you do eat foods with fat, make sure they have the good fats. Monounsaturated and polyunsaturated fats are good fats. These fats help raise your good cholesterol and protect your heart.  General   How to Lower Fat and Cholesterol in Your Diet   · Read the labels of the foods you buy from the market to find out how much fat is present. Under 5% of total fat on a label means it is "low fat". Over 20% of total fat on a label means it is high fat.  · Eat " high fiber foods, like soluble fiber. This type of fiber helps lower cholesterol in the body. Choose oatmeal, fruits (like apples), beans, and nuts to get the most soluble fiber.  · Eat foods high in omega-3 fatty acids like toshia seeds, walnuts, salmon, tuna, trout, herring, flaxseed, and soybeans. These foods help keep the heart healthy.  · Limit your bad fat and oil intake.  · Stay away from butter, stick margarine, shortening, lard, and palm and coconut oil. Pick plant-based spreads instead.  · Limit mayonnaise, salad dressings, gravies, and sauces, unless it is made from low-fat ingredients.  · Limit chocolate.  · Do not eat high-fat processed foods like hot dogs, newell, sausage, ham and other luncheon meats high in fat, and some frozen foods. Pick fish, chicken, turkey, and lean meats instead.  · Eat more dried beans, lentils, and tofu to get your protein.  · Do not eat organ meats, like liver.  · Choose nonfat or low-fat milk, yogurt, and cheese.  · Use light or fat-free cream cheese and sour cream.  · Eat lots of fruits and vegetables.  · Pick whole grain breads, cereals, pastas, and rice.  · Do not eat snacks that are high in fats like granola, cookies, pies, pastries, doughnuts, and croissants.  · Stay away from deep fried foods.  Help When Cooking   · Remove the fat portion of meats and the skin from poultry before cooking.  · Bake, broil, grill, poach, or roast poultry, fish, and lean meats.  · Drain and throw away the fat that drains out of meat as you cook it.  · Try to add little or no fat to foods.  · Use olive or canola oil for cooking or baking.  · Steam your vegetables.  · Use herbs or no-oil marinades to flavor foods.         Who should use this diet?   This diet is for people who are at high risk of getting health problems like heart disease, high blood pressure, diabetes, and others. This diet is also good for all people to follow to keep your heart healthy.  What foods are good to eat?   Foods  with good fats are:  · Canola, peanut, and olive oil  · Safflower, soybean, and corn oil  · Walnuts, almonds, cashews, and peanuts  · Pumpkin and sunflower seeds  · [place] and tuna  · Tofu  · Soymilk  · Avocado  What foods should be limited or avoided?   Stay away from these types of foods that have saturated fats:  · Whole fat dairy products like cheese, ice cream, whole milk, and cream  · Palm and coconut oils  · High-fat meats like beef, lamb, poultry with the skin, newell, and sausage  · Butter and lard  Stay away from these types of foods that may have trans fat:  · Cookies, cakes, candy, doughnuts, baked goods, muffins, pizza dough, and pie crusts that are packaged  · Fried foods  · Frozen dinners  · Chips and crackers  · Microwave popcorn  · Stick margarine and vegetable shortenings  Helpful tips   To help stay away from saturated fat:  · Pick lean cuts of meat  · Take the skin off chicken and turkey or pick skinless  · Pick low-fat cheese, milk, and ice cream  · Use liquid oils when cooking and baking, such as olive oil and canola oil  To help stay away from trans fat:  · Look at your labels. Choose foods with 0% trans fat. Read the ingredient list. Avoid foods with partially hydrogenated oil in the ingredient list. This means there is trans fat in the product.  Where can I learn more?   American Heart Association   http://www.heart.org/HEARTORG/Conditions/Cholesterol/PreventionTreatmentofHighCholesterol/Know-Your-Fats_[facility]_305628_Article.jsp   Last Reviewed Date   2021-10-05  Consumer Information Use and Disclaimer   This information is not specific medical advice and does not replace information you receive from your health care provider. This is only a brief summary of general information. It does NOT include all information about conditions, illnesses, injuries, tests, procedures, treatments, therapies, discharge instructions or life-style choices that may apply to you. You must talk with your health care  provider for complete information about your health and treatment options. This information should not be used to decide whether or not to accept your health care providers advice, instructions or recommendations. Only your health care provider has the knowledge and training to provide advice that is right for you.   Copyright   Copyright © 2021 Up[employer]Date, Inc. and its affiliates and/or licensors. All rights reserved.    Patient Education       Seasonal Allergies Discharge Instructions   About this topic   Seasonal allergies are also called allergic rhinitis or hay fever. You may have sneezing; a stuffy or runny nose; or itchy throat, ears, or eyes. You may feel very tired. Most often, this problem is caused by:  · Pollens from trees, grasses, or weeds.  · Mold spores that grow when the air is humid, wet, or damp.  Some people can breathe in these things and have no problems. But when you have a seasonal allergy, your body acts as if the substance is harming you. Then you have symptoms. You may have symptoms only at some times of the year. If your symptoms last all year, they may be caused by:  · Insects, such as dust mites or cock roaches.  · Animals, such as cats or dogs.  · Mold spores.     What care is needed at home?   1. Ask your doctor what you need to do when you go home. Make sure you ask questions if you do not understand what the doctor says.  2. Rinse your nose with salt water to get rid of pollen inside of your nose.  3. Keep the windows closed and use air conditioning.  4. Shower before bed to rinse pollen from your hair and skin.  5. Wear a dust mask if you need to be outside.  6. Use over-the-counter medicines to help with your symptoms:  ? A steroid nose spray can help with a stuffy nose. It can also help with drainage down the back of your throat.  ? An antihistamine can help with itching, sneezing, or runny nose.  ? An antihistamine eye drop can help with itchy eyes.  ? A decongestant can help  with a stuffy nose. Talk with your doctor or pharmacist about your other health problems before you use this kind of medicine. It may not be safe for people with high blood pressure.  What follow-up care is needed?   Your doctor may ask you to make visits to the office to check on your progress. Your doctor may ask you to see an allergy specialist, or to have testing done to learn what is causing your allergies. Be sure to keep these visits.  What drugs may be needed?   The doctor may order drugs to treat the signs. Take your drugs as ordered. You may also take allergy shots if you have had allergy tests.  Will physical activity be limited?   You may have to limit your activity. If your health problem is caused by an allergy to pollens or molds, you may want to limit your time outdoors. Talk to your doctor about what activities are best for you.  What problems could happen?   · Your signs may not get better with your drugs  · Asthma may get worse  · Sinus infection  What can be done to prevent this health problem?   Try to avoid allergens.  1. If you are allergic to pollens, grasses, trees, etc:  ? Stay inside in the morning when pollen counts are high.  ? Avoid going outside when the trees, grasses, or weeds are blooming.  ? Keep the windows of your house and car closed.  ? Use an air conditioner.  2. If you are allergic to dust, molds, dust mites, pets, etc:  ? Clean your air conditioner or furnace filters regularly.  ? Cover pillows and mattresses with vinyl covers to lower your exposure to dust mites.  ? Wash bedding weekly in very hot water.  ? Use fewer dust-collecting items, such as curtains, bed skirts, carpeting, and stuffed animals, mainly in your bedroom.  ? If you can't avoid having a furry pet, vacuum often and keep your pet out of bedrooms and other rooms with carpets.  When do I need to call the doctor?   · You are having so much trouble breathing that you can only say one or two words at a  time.  · You need to sit upright at all times to be able to breathe and or cannot lie down.  · You have severe swelling of your tongue, lips, or mouth.  · You have trouble breathing when talking or sitting still.  · You have a fever of 100.4°F (38°C) or higher.  · You have green or yellow mucus.  Teach Back: Helping You Understand   The Teach Back Method helps you understand the information we are giving you. After you talk with the staff, tell them in your own words what you learned. This helps to make sure the staff has described each thing clearly. It also helps to explain things that may have been confusing. Before going home, make sure you can do these:  · I can tell you about my condition.  · I can tell you what may help make the air .  · I can tell you what may help ease my allergies.  Where can I learn more?   Food and Drug Administration  https://www.fda.gov/ForConsumers/ConsumerUpdates/hws034255.htm   NHS Choices  https://www.nhs.uk/conditions/Hay-fever/   Last Reviewed Date   2021-06-21  Consumer Information Use and Disclaimer   This information is not specific medical advice and does not replace information you receive from your health care provider. This is only a brief summary of general information. It does NOT include all information about conditions, illnesses, injuries, tests, procedures, treatments, therapies, discharge instructions or life-style choices that may apply to you. You must talk with your health care provider for complete information about your health and treatment options. This information should not be used to decide whether or not to accept your health care providers advice, instructions or recommendations. Only your health care provider has the knowledge and training to provide advice that is right for you.  Copyright   Copyright © 2021 [employer]Date, [employer]. and its affiliates and/or licensors. All rights reserved.          Risks, benefits, and alternatives discussed with patient,  Patient verbalized understanding of discussed plan of care. Asked patient if any further questions, answered no.    Future Appointments   Date Time Provider Department Center   8/4/2022  8:30 AM Rosalva Delvalle MD [id number]   9/1/2022 11:00 AM Bertha Stein NP [facility] Naa Stein NP

## 2022-06-01 NOTE — PATIENT INSTRUCTIONS
"RTC in 3 months for F/U or sooner if needed.    Keep appts with specialists as scheduled.    Instructed patient to report to nearest ER or call 911 if begins to have difficulty breathing, turning blue, chest pain, B/P < 80/60 or >170/100, palpitations, syncope, extreme weakness, or severe H/A. Patient verbalized understanding.      Patient Education       Low Cholesterol, Saturated Fat, and Trans Fat Diet   About this topic   Cholesterol, saturated fat, and trans fat are in many foods. These may raise your blood cholesterol levels. If your cholesterol is too high, this can cause health problems in your heart, liver, kidneys, and even your eyes. The key to lowering your risk of heart problems is to lower your bad fat intake.  Saturated fats and trans fats are the bad fats. These fats clog your arteries and raise your bad cholesterol. Saturated fats and trans fats are solid fats at room temperature. Saturated fats are animal fats. Trans fats are manmade fats. They add flavor to a lot of packaged foods. Staying away from saturated and trans fats will help your heart.  When you do eat foods with fat, make sure they have the good fats. Monounsaturated and polyunsaturated fats are good fats. These fats help raise your good cholesterol and protect your heart.  General   How to Lower Fat and Cholesterol in Your Diet   Read the labels of the foods you buy from the market to find out how much fat is present. Under 5% of total fat on a label means it is "low fat". Over 20% of total fat on a label means it is high fat.  Eat high fiber foods, like soluble fiber. This type of fiber helps lower cholesterol in the body. Choose oatmeal, fruits (like apples), beans, and nuts to get the most soluble fiber.  Eat foods high in omega-3 fatty acids like toshia seeds, walnuts, salmon, tuna, trout, herring, flaxseed, and soybeans. These foods help keep the heart healthy.  Limit your bad fat and oil intake.  Stay away from butter, stick " margarine, shortening, lard, and palm and coconut oil. Pick plant-based spreads instead.  Limit mayonnaise, salad dressings, gravies, and sauces, unless it is made from low-fat ingredients.  Limit chocolate.  Do not eat high-fat processed foods like hot dogs, newell, sausage, ham and other luncheon meats high in fat, and some frozen foods. Pick fish, chicken, turkey, and lean meats instead.  Eat more dried beans, lentils, and tofu to get your protein.  Do not eat organ meats, like liver.  Choose nonfat or low-fat milk, yogurt, and cheese.  Use light or fat-free cream cheese and sour cream.  Eat lots of fruits and vegetables.  Pick whole grain breads, cereals, pastas, and rice.  Do not eat snacks that are high in fats like granola, cookies, pies, pastries, doughnuts, and croissants.  Stay away from deep fried foods.  Help When Cooking   Remove the fat portion of meats and the skin from poultry before cooking.  Bake, broil, grill, poach, or roast poultry, fish, and lean meats.  Drain and throw away the fat that drains out of meat as you cook it.  Try to add little or no fat to foods.  Use olive or canola oil for cooking or baking.  Steam your vegetables.  Use herbs or no-oil marinades to flavor foods.         Who should use this diet?   This diet is for people who are at high risk of getting health problems like heart disease, high blood pressure, diabetes, and others. This diet is also good for all people to follow to keep your heart healthy.  What foods are good to eat?   Foods with good fats are:  Canola, peanut, and olive oil  Safflower, soybean, and corn oil  Walnuts, almonds, cashews, and peanuts  Pumpkin and sunflower seeds  [place] and tuna  Tofu  Soymilk  Avocado  What foods should be limited or avoided?   Stay away from these types of foods that have saturated fats:  Whole fat dairy products like cheese, ice cream, whole milk, and cream  Palm and coconut oils  High-fat meats like beef, lamb, poultry with the  skin, newell, and sausage  Butter and lard  Stay away from these types of foods that may have trans fat:  Cookies, cakes, candy, doughnuts, baked goods, muffins, pizza dough, and pie crusts that are packaged  Fried foods  Frozen dinners  Chips and crackers  Microwave popcorn  Stick margarine and vegetable shortenings  Helpful tips   To help stay away from saturated fat:  Pick lean cuts of meat  Take the skin off chicken and turkey or pick skinless  Pick low-fat cheese, milk, and ice cream  Use liquid oils when cooking and baking, such as olive oil and canola oil  To help stay away from trans fat:  Look at your labels. Choose foods with 0% trans fat. Read the ingredient list. Avoid foods with partially hydrogenated oil in the ingredient list. This means there is trans fat in the product.  Where can I learn more?   American Heart Association   http://www.heart.org/HEARTORG/Conditions/Cholesterol/PreventionTreatmentofHighCholesterol/Know-Your-Fats_[facility]_305628_Article.jsp   Last Reviewed Date   2021-10-05  Consumer Information Use and Disclaimer   This information is not specific medical advice and does not replace information you receive from your health care provider. This is only a brief summary of general information. It does NOT include all information about conditions, illnesses, injuries, tests, procedures, treatments, therapies, discharge instructions or life-style choices that may apply to you. You must talk with your health care provider for complete information about your health and treatment options. This information should not be used to decide whether or not to accept your health care providers advice, instructions or recommendations. Only your health care provider has the knowledge and training to provide advice that is right for you.   Copyright   Copyright © 2021 UpToDate, Inc. and its affiliates and/or licensors. All rights reserved.    Patient Education       Seasonal Allergies Discharge Instructions    About this topic   Seasonal allergies are also called allergic rhinitis or hay fever. You may have sneezing; a stuffy or runny nose; or itchy throat, ears, or eyes. You may feel very tired. Most often, this problem is caused by:  Pollens from trees, grasses, or weeds.  Mold spores that grow when the air is humid, wet, or damp.  Some people can breathe in these things and have no problems. But when you have a seasonal allergy, your body acts as if the substance is harming you. Then you have symptoms. You may have symptoms only at some times of the year. If your symptoms last all year, they may be caused by:  Insects, such as dust mites or cock roaches.  Animals, such as cats or dogs.  Mold spores.     What care is needed at home?   Ask your doctor what you need to do when you go home. Make sure you ask questions if you do not understand what the doctor says.  Rinse your nose with salt water to get rid of pollen inside of your nose.  Keep the windows closed and use air conditioning.  Shower before bed to rinse pollen from your hair and skin.  Wear a dust mask if you need to be outside.  Use over-the-counter medicines to help with your symptoms:  A steroid nose spray can help with a stuffy nose. It can also help with drainage down the back of your throat.  An antihistamine can help with itching, sneezing, or runny nose.  An antihistamine eye drop can help with itchy eyes.  A decongestant can help with a stuffy nose. Talk with your doctor or pharmacist about your other health problems before you use this kind of medicine. It may not be safe for people with high blood pressure.  What follow-up care is needed?   Your doctor may ask you to make visits to the office to check on your progress. Your doctor may ask you to see an allergy specialist, or to have testing done to learn what is causing your allergies. Be sure to keep these visits.  What drugs may be needed?   The doctor may order drugs to treat the signs. Take  your drugs as ordered. You may also take allergy shots if you have had allergy tests.  Will physical activity be limited?   You may have to limit your activity. If your health problem is caused by an allergy to pollens or molds, you may want to limit your time outdoors. Talk to your doctor about what activities are best for you.  What problems could happen?   Your signs may not get better with your drugs  Asthma may get worse  Sinus infection  What can be done to prevent this health problem?   Try to avoid allergens.  If you are allergic to pollens, grasses, trees, etc:  Stay inside in the morning when pollen counts are high.  Avoid going outside when the trees, grasses, or weeds are blooming.  Keep the windows of your house and car closed.  Use an air conditioner.  If you are allergic to dust, molds, dust mites, pets, etc:  Clean your air conditioner or furnace filters regularly.  Cover pillows and mattresses with vinyl covers to lower your exposure to dust mites.  Wash bedding weekly in very hot water.  Use fewer dust-collecting items, such as curtains, bed skirts, carpeting, and stuffed animals, mainly in your bedroom.  If you can't avoid having a furry pet, vacuum often and keep your pet out of bedrooms and other rooms with carpets.  When do I need to call the doctor?   You are having so much trouble breathing that you can only say one or two words at a time.  You need to sit upright at all times to be able to breathe and or cannot lie down.  You have severe swelling of your tongue, lips, or mouth.  You have trouble breathing when talking or sitting still.  You have a fever of 100.4°F (38°C) or higher.  You have green or yellow mucus.  Teach Back: Helping You Understand   The Teach Back Method helps you understand the information we are giving you. After you talk with the staff, tell them in your own words what you learned. This helps to make sure the staff has described each thing clearly. It also helps to  explain things that may have been confusing. Before going home, make sure you can do these:  I can tell you about my condition.  I can tell you what may help make the air .  I can tell you what may help ease my allergies.  Where can I learn more?   Food and Drug Administration  https://www.fda.gov/ForConsumers/ConsumerUpdates/tlh232579.htm   NHS Choices  https://www.nhs.uk/conditions/Hay-fever/   Last Reviewed Date   2021-06-21  Consumer Information Use and Disclaimer   This information is not specific medical advice and does not replace information you receive from your health care provider. This is only a brief summary of general information. It does NOT include all information about conditions, illnesses, injuries, tests, procedures, treatments, therapies, discharge instructions or life-style choices that may apply to you. You must talk with your health care provider for complete information about your health and treatment options. This information should not be used to decide whether or not to accept your health care providers advice, instructions or recommendations. Only your health care provider has the knowledge and training to provide advice that is right for you.  Copyright   Copyright © 2021 UpToDate, Inc. and its affiliates and/or licensors. All rights reserved.

## 2022-06-14 LAB — BCS RECOMMENDATION EXT: NORMAL

## 2022-06-15 ENCOUNTER — TELEPHONE (OUTPATIENT)
Dept: PRIMARY CARE CLINIC | Facility: CLINIC | Age: 57
End: 2022-06-15
Payer: COMMERCIAL

## 2022-06-15 ENCOUNTER — PATIENT OUTREACH (OUTPATIENT)
Dept: ADMINISTRATIVE | Facility: HOSPITAL | Age: 57
End: 2022-06-15
Payer: COMMERCIAL

## 2022-06-15 NOTE — TELEPHONE ENCOUNTER
----- Message from Bertha Stein NP sent at 6/15/2022  9:43 AM CDT -----  Please notify patient her Mammogram shows no abnormal masses, repeat in 1 year for routine breast cancer screening.

## 2022-08-04 ENCOUNTER — OFFICE VISIT (OUTPATIENT)
Dept: OBSTETRICS AND GYNECOLOGY | Facility: CLINIC | Age: 57
End: 2022-08-04
Payer: COMMERCIAL

## 2022-08-04 VITALS
WEIGHT: 134 LBS | SYSTOLIC BLOOD PRESSURE: 107 MMHG | HEART RATE: 84 BPM | BODY MASS INDEX: 24.51 KG/M2 | DIASTOLIC BLOOD PRESSURE: 71 MMHG

## 2022-08-04 DIAGNOSIS — Z01.419 WELL WOMAN EXAM WITH ROUTINE GYNECOLOGICAL EXAM: ICD-10-CM

## 2022-08-04 PROCEDURE — 1159F MED LIST DOCD IN RCRD: CPT | Mod: CPTII,S$GLB,, | Performed by: OBSTETRICS & GYNECOLOGY

## 2022-08-04 PROCEDURE — 1160F RVW MEDS BY RX/DR IN RCRD: CPT | Mod: CPTII,S$GLB,, | Performed by: OBSTETRICS & GYNECOLOGY

## 2022-08-04 PROCEDURE — 99386 PR PREVENTIVE VISIT,NEW,40-64: ICD-10-PCS | Mod: S$GLB,,, | Performed by: OBSTETRICS & GYNECOLOGY

## 2022-08-04 PROCEDURE — 3074F PR MOST RECENT SYSTOLIC BLOOD PRESSURE < 130 MM HG: ICD-10-PCS | Mod: CPTII,S$GLB,, | Performed by: OBSTETRICS & GYNECOLOGY

## 2022-08-04 PROCEDURE — 3008F BODY MASS INDEX DOCD: CPT | Mod: CPTII,S$GLB,, | Performed by: OBSTETRICS & GYNECOLOGY

## 2022-08-04 PROCEDURE — 3008F PR BODY MASS INDEX (BMI) DOCUMENTED: ICD-10-PCS | Mod: CPTII,S$GLB,, | Performed by: OBSTETRICS & GYNECOLOGY

## 2022-08-04 PROCEDURE — 3078F PR MOST RECENT DIASTOLIC BLOOD PRESSURE < 80 MM HG: ICD-10-PCS | Mod: CPTII,S$GLB,, | Performed by: OBSTETRICS & GYNECOLOGY

## 2022-08-04 PROCEDURE — 3078F DIAST BP <80 MM HG: CPT | Mod: CPTII,S$GLB,, | Performed by: OBSTETRICS & GYNECOLOGY

## 2022-08-04 PROCEDURE — 1160F PR REVIEW ALL MEDS BY PRESCRIBER/CLIN PHARMACIST DOCUMENTED: ICD-10-PCS | Mod: CPTII,S$GLB,, | Performed by: OBSTETRICS & GYNECOLOGY

## 2022-08-04 PROCEDURE — 1159F PR MEDICATION LIST DOCUMENTED IN MEDICAL RECORD: ICD-10-PCS | Mod: CPTII,S$GLB,, | Performed by: OBSTETRICS & GYNECOLOGY

## 2022-08-04 PROCEDURE — 99386 PREV VISIT NEW AGE 40-64: CPT | Mod: S$GLB,,, | Performed by: OBSTETRICS & GYNECOLOGY

## 2022-08-04 PROCEDURE — 3074F SYST BP LT 130 MM HG: CPT | Mod: CPTII,S$GLB,, | Performed by: OBSTETRICS & GYNECOLOGY

## 2022-08-05 LAB — Lab: NORMAL

## 2022-08-05 NOTE — PROGRESS NOTES
Subjective:       Patient ID: Shima Wood is a 57 y.o. female.    Chief Complaint:  Well Woman      History of Present Illness  Pt here for gyn annual.  History and past labs reviewed with patient.    Complaints none       Past Medical History:   Diagnosis Date    Hyperlipidemia        History reviewed. No pertinent surgical history.    Gyn: no STD, never had abn pap   Meds:   Current Outpatient Medications:     atorvastatin (LIPITOR) 20 MG tablet, Take 1 tablet (20 mg total) by mouth every evening., Disp: 30 tablet, Rfl: 11    desloratadine (CLARINEX) 5 mg tablet, Take 1 tablet (5 mg total) by mouth once daily., Disp: 30 tablet, Rfl: 2    methylPREDNISolone (MEDROL DOSEPACK) 4 mg tablet, use as directed, Disp: 1 each, Rfl: 0    All: Review of patient's allergies indicates:  No Known Allergies    SH:   Social History     Tobacco Use    Smoking status: Never Smoker    Smokeless tobacco: Never Used   Substance Use Topics    Alcohol use: Not on file      FH: family history is not on file.          Review of Systems  Review of Systems   Constitutional: Negative for chills and fever.   Respiratory: Negative for shortness of breath.    Cardiovascular: Negative for chest pain.   Gastrointestinal: Negative for abdominal pain, blood in stool, constipation, diarrhea, nausea, vomiting and reflux.   Genitourinary: Negative for dysmenorrhea, dyspareunia, dysuria, hematuria, hot flashes, menorrhagia, menstrual problem, pelvic pain, vaginal bleeding, vaginal discharge, postcoital bleeding and vaginal dryness.   Musculoskeletal: Negative for arthralgias and joint swelling.   Integumentary:  Negative for rash, hair changes, breast mass, nipple discharge and breast skin changes.   Psychiatric/Behavioral: Negative for depression. The patient is not nervous/anxious.    Breast: Negative for asymmetry, lump, mass, nipple discharge and skin changes          Objective:     Vitals:    08/04/22 0840   BP: 107/71   Pulse: 84    Weight: 60.8 kg (134 lb)       Physical Exam:   Constitutional: She appears well-developed and well-nourished. No distress.    HENT:   Head: Normocephalic and atraumatic.    Eyes: Conjunctivae and EOM are normal.    Neck: No tracheal deviation present. No thyromegaly present.    Cardiovascular: Exam reveals no clubbing, no cyanosis and no edema.     Pulmonary/Chest: Effort normal. No respiratory distress.        Abdominal: Soft. She exhibits no distension and no mass. There is no abdominal tenderness. There is no rebound and no guarding. No hernia.     Genitourinary:    Vagina, uterus and rectum normal.      Pelvic exam was performed with patient supine.   There is no rash, tenderness, lesion or injury on the right labia. There is no rash, tenderness, lesion or injury on the left labia. Cervix is normal. Right adnexum displays no mass, no tenderness and no fullness. Left adnexum displays no mass, no tenderness and no fullness.                Skin: She is not diaphoretic. No cyanosis. Nails show no clubbing.         breast exam wnl- no nipple dc, skin changes, masses or lymph nodes palpated bilaterally    Assessment:        1. Well woman exam with routine gynecological exam                Plan:      Annual   Pap today   STD panel declined   MMG ordered  TSH, Lipid panel, A1c with PCP   Colonoscopy with PCP   Risk assessment for inherited gyn cancer done - neg   RTC  1 year

## 2022-08-23 ENCOUNTER — PATIENT OUTREACH (OUTPATIENT)
Dept: ADMINISTRATIVE | Facility: HOSPITAL | Age: 57
End: 2022-08-23
Payer: COMMERCIAL

## 2022-08-23 NOTE — LETTER
AUTHORIZATION FOR RELEASE OF   CONFIDENTIAL INFORMATION        We are seeing Shima Wood, date of birth 1965, in the clinic at [facility]. Yenifer Stein NP is the patient's PCP. Shima Wood has an outstanding lab/procedure at the time we reviewed her chart. In order to help keep her health information updated, she has authorized us to request the following medical record(s):        (  )  MAMMOGRAM                                      ( X )  COLONOSCOPY      (  )  PAP SMEAR                                          (  )  OUTSIDE LAB RESULTS     (  )  DEXA SCAN                                          (  )  EYE EXAM            (  )  FOOT EXAM                                          (  )  ENTIRE RECORD     (  )  OUTSIDE IMMUNIZATIONS                 (  )  _______________         Please fax records to [facility], LaTosha B Mackey, NP, [phone]     If you have any questions, please contact Evan Casanova          Patient Name: Shima Wood  : 1965  Patient Phone #: [phone]

## 2022-08-23 NOTE — PROGRESS NOTES
Working the  Colon repot: Requesting colonoscopy from [facility]  Fax # [phone]

## 2022-09-01 ENCOUNTER — TELEPHONE (OUTPATIENT)
Dept: PRIMARY CARE CLINIC | Facility: CLINIC | Age: 57
End: 2022-09-01
Payer: COMMERCIAL

## 2022-09-01 NOTE — TELEPHONE ENCOUNTER
----- Message from Emiliana Olson sent at 9/1/2022 10:42 AM CDT -----  Regarding: Same Day Appointment  Contact: patient  Per  phone call with patient, she stated that she had an appointment for today and it was canceled and reschedule on 09/29/2022.  The caller stated that she need to come in today because she is having problems with urinating and it is painful.  Please return call at [phone] (home).    Thanks,  SJ      
Spoke with pt and informed her that  is out of the office until Tuesday.  
6
29 y/o M with PMHx of HIV (not on antivirals for the past 2 months) presents to ED with diarrhea for the past several days. Pt states that there have been blood streaks in diarrhea for the past day. Pt also has c/o abdominal pain, fever, and mild nausea w/o emesis. Pt is able to tolerate water but anytime he eats food he has an episode of diarrhea. Pt has no history of abdominal surgeries.

## 2022-09-29 ENCOUNTER — OFFICE VISIT (OUTPATIENT)
Dept: PRIMARY CARE CLINIC | Facility: CLINIC | Age: 57
End: 2022-09-29
Payer: COMMERCIAL

## 2022-09-29 VITALS
HEART RATE: 70 BPM | DIASTOLIC BLOOD PRESSURE: 64 MMHG | SYSTOLIC BLOOD PRESSURE: 97 MMHG | WEIGHT: 134.81 LBS | BODY MASS INDEX: 24.81 KG/M2 | OXYGEN SATURATION: 99 % | HEIGHT: 62 IN

## 2022-09-29 DIAGNOSIS — Z00.00 ANNUAL PHYSICAL EXAM: ICD-10-CM

## 2022-09-29 DIAGNOSIS — E78.00 PURE HYPERCHOLESTEROLEMIA: ICD-10-CM

## 2022-09-29 DIAGNOSIS — R35.0 URINE FREQUENCY: ICD-10-CM

## 2022-09-29 DIAGNOSIS — Z13.29 THYROID DISORDER SCREEN: ICD-10-CM

## 2022-09-29 DIAGNOSIS — J30.2 SEASONAL ALLERGIES: ICD-10-CM

## 2022-09-29 DIAGNOSIS — Z13.1 DIABETES MELLITUS SCREENING: ICD-10-CM

## 2022-09-29 PROCEDURE — 3078F PR MOST RECENT DIASTOLIC BLOOD PRESSURE < 80 MM HG: ICD-10-PCS | Mod: CPTII,S$GLB,, | Performed by: NURSE PRACTITIONER

## 2022-09-29 PROCEDURE — 1159F PR MEDICATION LIST DOCUMENTED IN MEDICAL RECORD: ICD-10-PCS | Mod: CPTII,S$GLB,, | Performed by: NURSE PRACTITIONER

## 2022-09-29 PROCEDURE — 1160F PR REVIEW ALL MEDS BY PRESCRIBER/CLIN PHARMACIST DOCUMENTED: ICD-10-PCS | Mod: CPTII,S$GLB,, | Performed by: NURSE PRACTITIONER

## 2022-09-29 PROCEDURE — 99214 OFFICE O/P EST MOD 30 MIN: CPT | Mod: S$GLB,,, | Performed by: NURSE PRACTITIONER

## 2022-09-29 PROCEDURE — 3078F DIAST BP <80 MM HG: CPT | Mod: CPTII,S$GLB,, | Performed by: NURSE PRACTITIONER

## 2022-09-29 PROCEDURE — 3074F SYST BP LT 130 MM HG: CPT | Mod: CPTII,S$GLB,, | Performed by: NURSE PRACTITIONER

## 2022-09-29 PROCEDURE — 3074F PR MOST RECENT SYSTOLIC BLOOD PRESSURE < 130 MM HG: ICD-10-PCS | Mod: CPTII,S$GLB,, | Performed by: NURSE PRACTITIONER

## 2022-09-29 PROCEDURE — 3008F BODY MASS INDEX DOCD: CPT | Mod: CPTII,S$GLB,, | Performed by: NURSE PRACTITIONER

## 2022-09-29 PROCEDURE — 99214 PR OFFICE/OUTPT VISIT, EST, LEVL IV, 30-39 MIN: ICD-10-PCS | Mod: S$GLB,,, | Performed by: NURSE PRACTITIONER

## 2022-09-29 PROCEDURE — 1159F MED LIST DOCD IN RCRD: CPT | Mod: CPTII,S$GLB,, | Performed by: NURSE PRACTITIONER

## 2022-09-29 PROCEDURE — 3008F PR BODY MASS INDEX (BMI) DOCUMENTED: ICD-10-PCS | Mod: CPTII,S$GLB,, | Performed by: NURSE PRACTITIONER

## 2022-09-29 PROCEDURE — 1160F RVW MEDS BY RX/DR IN RCRD: CPT | Mod: CPTII,S$GLB,, | Performed by: NURSE PRACTITIONER

## 2022-09-29 RX ORDER — ATORVASTATIN CALCIUM 20 MG/1
20 TABLET, FILM COATED ORAL NIGHTLY
Qty: 30 TABLET | Refills: 11 | Status: SHIPPED | OUTPATIENT
Start: 2022-09-29 | End: 2023-09-05 | Stop reason: SDUPTHER

## 2022-09-29 RX ORDER — DESLORATADINE 5 MG/1
5 TABLET ORAL DAILY
Qty: 30 TABLET | Refills: 2 | Status: CANCELLED | OUTPATIENT
Start: 2022-09-29

## 2022-09-29 NOTE — PATIENT INSTRUCTIONS
"RTC in 6 months for F/U or sooner if needed.    Keep appts with specialists as scheduled.    Instructed patient to report to nearest ER or call 911 if begins to have difficulty breathing, turning blue, chest pain, B/P < 80/60 or >170/100, palpitations, syncope, extreme weakness, or severe H/A. Patient verbalized understanding.      Patient Education       Low Cholesterol, Saturated Fat, and Trans Fat Diet   About this topic   Cholesterol, saturated fat, and trans fat are in many foods. These may raise your blood cholesterol levels. If your cholesterol is too high, this can cause health problems in your heart, liver, kidneys, and even your eyes. The key to lowering your risk of heart problems is to lower your bad fat intake.  Saturated fats and trans fats are the bad fats. These fats clog your arteries and raise your bad cholesterol. Saturated fats and trans fats are solid fats at room temperature. Saturated fats are animal fats. Trans fats are manmade fats. They add flavor to a lot of packaged foods. Staying away from saturated and trans fats will help your heart.  When you do eat foods with fat, make sure they have the good fats. Monounsaturated and polyunsaturated fats are good fats. These fats help raise your good cholesterol and protect your heart.  General   How to Lower Fat and Cholesterol in Your Diet   Read the labels of the foods you buy from the market to find out how much fat is present. Under 5% of total fat on a label means it is "low fat". Over 20% of total fat on a label means it is high fat.  Eat high fiber foods, like soluble fiber. This type of fiber helps lower cholesterol in the body. Choose oatmeal, fruits (like apples), beans, and nuts to get the most soluble fiber.  Eat foods high in omega-3 fatty acids like toshia seeds, walnuts, salmon, tuna, trout, herring, flaxseed, and soybeans. These foods help keep the heart healthy.  Limit your bad fat and oil intake.  Stay away from butter, stick " margarine, shortening, lard, and palm and coconut oil. Pick plant-based spreads instead.  Limit mayonnaise, salad dressings, gravies, and sauces, unless it is made from low-fat ingredients.  Limit chocolate.  Do not eat high-fat processed foods like hot dogs, newell, sausage, ham and other luncheon meats high in fat, and some frozen foods. Pick fish, chicken, turkey, and lean meats instead.  Eat more dried beans, lentils, and tofu to get your protein.  Do not eat organ meats, like liver.  Choose nonfat or low-fat milk, yogurt, and cheese.  Use light or fat-free cream cheese and sour cream.  Eat lots of fruits and vegetables.  Pick whole grain breads, cereals, pastas, and rice.  Do not eat snacks that are high in fats like granola, cookies, pies, pastries, doughnuts, and croissants.  Stay away from deep fried foods.  Help When Cooking   Remove the fat portion of meats and the skin from poultry before cooking.  Bake, broil, grill, poach, or roast poultry, fish, and lean meats.  Drain and throw away the fat that drains out of meat as you cook it.  Try to add little or no fat to foods.  Use olive or canola oil for cooking or baking.  Steam your vegetables.  Use herbs or no-oil marinades to flavor foods.         Who should use this diet?   This diet is for people who are at high risk of getting health problems like heart disease, high blood pressure, diabetes, and others. This diet is also good for all people to follow to keep your heart healthy.  What foods are good to eat?   Foods with good fats are:  Canola, peanut, and olive oil  Safflower, soybean, and corn oil  Walnuts, almonds, cashews, and peanuts  Pumpkin and sunflower seeds  [place] and tuna  Tofu  Soymilk  Avocado  What foods should be limited or avoided?   Stay away from these types of foods that have saturated fats:  Whole fat dairy products like cheese, ice cream, whole milk, and cream  Palm and coconut oils  High-fat meats like beef, lamb, poultry with the  skin, newell, and sausage  Butter and lard  Stay away from these types of foods that may have trans fat:  Cookies, cakes, candy, doughnuts, baked goods, muffins, pizza dough, and pie crusts that are packaged  Fried foods  Frozen dinners  Chips and crackers  Microwave popcorn  Stick margarine and vegetable shortenings  Helpful tips   To help stay away from saturated fat:  Pick lean cuts of meat  Take the skin off chicken and turkey or pick skinless  Pick low-fat cheese, milk, and ice cream  Use liquid oils when cooking and baking, such as olive oil and canola oil  To help stay away from trans fat:  Look at your labels. Choose foods with 0% trans fat. Read the ingredient list. Avoid foods with partially hydrogenated oil in the ingredient list. This means there is trans fat in the product.  Where can I learn more?   American Heart Association   http://www.heart.org/HEARTORG/Conditions/Cholesterol/PreventionTreatmentofHighCholesterol/Know-Your-Fats_[facility]_305628_Article.jsp   Last Reviewed Date   2021-10-05  Consumer Information Use and Disclaimer   This information is not specific medical advice and does not replace information you receive from your health care provider. This is only a brief summary of general information. It does NOT include all information about conditions, illnesses, injuries, tests, procedures, treatments, therapies, discharge instructions or life-style choices that may apply to you. You must talk with your health care provider for complete information about your health and treatment options. This information should not be used to decide whether or not to accept your health care providers advice, instructions or recommendations. Only your health care provider has the knowledge and training to provide advice that is right for you.   Copyright   Copyright © 2021 UpToDate, Inc. and its affiliates and/or licensors. All rights reserved.

## 2022-09-29 NOTE — PROGRESS NOTES
Subjective:       Patient ID: Shima Wood is a 57 y.o. female.    Chief Complaint: Follow-up (Mammo results)    HPI: Shima is a 56 y.o. female who presents for F/U and to discuss lab results. Daughter present during visit.    Cholesterol elevated on last labs so she was started back on STATIN daily. She has been trying to eat healthier but does not exercise.    Mammogram done in June and was normal.    She has been in menopause for 6 years. Referred to Dr. Delvalle for well woman, she was seen in August with PAP smear done which was normal.    Had a Colonoscopy done last year at [facility] and had some polyps removed and told to repeat in 3 years.    Denies smoking or drinking.             Past Medical History:   Diagnosis Date    Hyperlipidemia        History reviewed. No pertinent surgical history.    History reviewed. No pertinent family history.    Social History     Tobacco Use    Smoking status: Never    Smokeless tobacco: Never       Patient Active Problem List   Diagnosis    Pure hypercholesterolemia    Seasonal allergies       Immunization History   Administered Date(s) Administered    COVID-19, MRNA, LN-S, PF (MODERNA FULL 0.5 ML DOSE) 03/30/2021, 04/27/2021, 04/12/2022           Review of Systems   Constitutional:  Negative for activity change, appetite change, chills, diaphoresis, fatigue and fever.   HENT:  Negative for tinnitus and trouble swallowing.    Eyes:  Negative for visual disturbance.   Respiratory:  Negative for cough, chest tightness, shortness of breath and wheezing.    Cardiovascular:  Negative for chest pain, palpitations and leg swelling.   Gastrointestinal:  Negative for abdominal distention and abdominal pain.   Genitourinary:  Negative for dysuria, frequency, hematuria and urgency.   Musculoskeletal:  Negative for gait problem, neck pain and neck stiffness.   Skin:  Negative for color change and rash.   Neurological:  Negative for dizziness, syncope, weakness, light-headedness,  "numbness and headaches.   Psychiatric/Behavioral:  Negative for behavioral problems and confusion.    Objective:     Vitals:    09/29/22 1126   BP: 97/64   BP Location: Right arm   Patient Position: Sitting   BP Method: Medium (Automatic)   Pulse: 70   SpO2: 99%   Weight: 61.1 kg (134 lb 12.8 oz)   Height: 5' 2" (1.575 m)         Physical Exam  Vitals and nursing note reviewed.   Constitutional:       Appearance: Normal appearance.   HENT:      Head: Normocephalic and atraumatic.      Nose: Nose normal.      Mouth/Throat:      Mouth: Mucous membranes are moist.      Pharynx: Oropharynx is clear.   Eyes:      Extraocular Movements: Extraocular movements intact.      Conjunctiva/sclera: Conjunctivae normal.      Pupils: Pupils are equal, round, and reactive to light.   Cardiovascular:      Rate and Rhythm: Normal rate and regular rhythm.      Pulses: Normal pulses.      Heart sounds: Normal heart sounds.   Pulmonary:      Effort: Pulmonary effort is normal.      Breath sounds: Normal breath sounds.   Abdominal:      General: There is no distension.      Tenderness: There is no abdominal tenderness.   Musculoskeletal:         General: No tenderness. Normal range of motion.      Cervical back: Normal range of motion and neck supple.      Right lower leg: No edema.      Left lower leg: No edema.   Skin:     General: Skin is warm and dry.      Capillary Refill: Capillary refill takes less than 2 seconds.   Neurological:      Mental Status: She is alert and oriented to person, place, and time.   Psychiatric:         Mood and Affect: Mood and affect normal.         Behavior: Behavior normal. Behavior is cooperative.       Office Visit on 08/04/2022   Component Date Value Ref Range Status    Disclaimer 08/04/2022    Final                    Value:DISCLAIMER: Annual Pap smears are the best means now available for the early detection of cervical cancer.  There is no guarantee, however, that a particular Pap smear will  " diseased cells that are present.  Thus, false negative reports may   occasionally occur.     Patient Outreach on 06/15/2022   Component Date Value Ref Range Status    BCS Recommendation External 06/14/2022 Repeat mammogram in 1 year   Final   Office Visit on 05/16/2022   Component Date Value Ref Range Status    Glucose 05/16/2022 85  65 - 99 mg/dL Final    BUN 05/16/2022 13  7 - 25 mg/dL Final    Creatinine 05/16/2022 0.63  0.50 - 1.05 mg/dL Final    eGFR if non African American 05/16/2022 102  > OR = 60 mL/min/1.73m2 Final    eGFR if African American 05/16/2022 118  > OR = 60 mL/min/1.73m2 Final    BUN/Creatinine Ratio 05/16/2022 NOT APPLICABLE  6 - 22 (calc) Final    Sodium 05/16/2022 140  135 - 146 mmol/L Final    Potassium 05/16/2022 4.1  3.5 - 5.3 mmol/L Final    Chloride 05/16/2022 102  98 - 110 mmol/L Final    CO2 05/16/2022 31  20 - 32 mmol/L Final    Calcium 05/16/2022 9.7  8.6 - 10.4 mg/dL Final    Total Protein 05/16/2022 7.2  6.1 - 8.1 g/dL Final    Albumin 05/16/2022 4.6  3.6 - 5.1 g/dL Final    Globulin, Total 05/16/2022 2.6  1.9 - 3.7 g/dL (calc) Final    Albumin/Globulin Ratio 05/16/2022 1.8  1.0 - 2.5 (calc) Final    Total Bilirubin 05/16/2022 0.6  0.2 - 1.2 mg/dL Final    Alkaline Phosphatase 05/16/2022 76  37 - 153 U/L Final    AST 05/16/2022 28  10 - 35 U/L Final    ALT 05/16/2022 29  6 - 29 U/L Final    WBC 05/16/2022 4.4  3.8 - 10.8 Thousand/uL Final    RBC 05/16/2022 4.45  3.80 - 5.10 Million/uL Final    Hemoglobin 05/16/2022 13.9  11.7 - 15.5 g/dL Final    Hematocrit 05/16/2022 42.0  35.0 - 45.0 % Final    MCV 05/16/2022 94.4  80.0 - 100.0 fL Final    MCH 05/16/2022 31.2  27.0 - 33.0 pg Final    MCHC 05/16/2022 33.1  32.0 - 36.0 g/dL Final    RDW 05/16/2022 12.5  11.0 - 15.0 % Final    Platelets 05/16/2022 290  140 - 400 Thousand/uL Final    MPV 05/16/2022 9.7  7.5 - 12.5 fL Final    Neutrophils, Abs 05/16/2022 1,940  1,500 - 7,800 cells/uL Final    Lymph # 05/16/2022 1,971  850 - 3,900  cells/uL Final    Mono # 05/16/2022 378  200 - 950 cells/uL Final    Eos # 05/16/2022 62  15 - 500 cells/uL Final    Baso # 05/16/2022 48  0 - 200 cells/uL Final    Neutrophils Relative 05/16/2022 44.1  % Final    Lymph % 05/16/2022 44.8  % Final    Mono % 05/16/2022 8.6  % Final    Eosinophil % 05/16/2022 1.4  % Final    Basophil % 05/16/2022 1.1  % Final    TSH 05/16/2022 1.54  mIU/L Final    Cholesterol 05/16/2022 206 (H)  <200 mg/dL Final    HDL 05/16/2022 66  > OR = 50 mg/dL Final    Triglycerides 05/16/2022 117  <150 mg/dL Final    LDL Cholesterol 05/16/2022 118 (H)  mg/dL (calc) Final    HDL/Cholesterol Ratio 05/16/2022 3.1  <5.0 (calc) Final    Non HDL Chol. (LDL+VLDL) 05/16/2022 140 (H)  <130 mg/dL (calc) Final    BIENVENIDO 05/16/2022 NEGATIVE  NEGATIVE Final    Rheumatoid Factor 05/16/2022 <14  <14 IU/mL Final         Assessment:      1. Pure hypercholesterolemia    2. Seasonal allergies    3. Annual physical exam    4. Urine frequency    5. Thyroid disorder screen    6. Diabetes mellitus screening          Plan:     Pure hypercholesterolemia  Comments:  Continue STATIN daily, eat low cholesterol diet and exercise regularly  Orders:  -     atorvastatin (LIPITOR) 20 MG tablet; Take 1 tablet (20 mg total) by mouth every evening.  Dispense: 30 tablet; Refill: 11  -     Lipid Panel; Future; Expected date: 10/04/2022    Seasonal allergies    Annual physical exam  Comments:  Labs ordered to be drawn prior to next visit.  Orders:  -     CBC Auto Differential; Future; Expected date: 10/04/2022  -     Comprehensive Metabolic Panel; Future; Expected date: 10/04/2022  -     Hemoglobin A1C; Future; Expected date: 10/04/2022  -     Lipid Panel; Future; Expected date: 10/04/2022  -     TSH w/reflex to FT4; Future; Expected date: 10/04/2022    Urine frequency  -     Urinalysis, Reflex to Urine Culture Urine, Clean Catch; Future; Expected date: 10/04/2022    Thyroid disorder screen  -     TSH w/reflex to FT4; Future; Expected  date: 10/04/2022    Diabetes mellitus screening  -     Hemoglobin A1C; Future; Expected date: 10/04/2022       Current Outpatient Medications   Medication Sig Dispense Refill    desloratadine (CLARINEX) 5 mg tablet Take 1 tablet (5 mg total) by mouth once daily. 30 tablet 2    atorvastatin (LIPITOR) 20 MG tablet Take 1 tablet (20 mg total) by mouth every evening. 30 tablet 11     No current facility-administered medications for this visit.       Medications Discontinued During This Encounter   Medication Reason    methylPREDNISolone (MEDROL DOSEPACK) 4 mg tablet Patient no longer taking    atorvastatin (LIPITOR) 20 MG tablet Reorder         Health Maintenance   Topic Date Due    Hepatitis C Screening  Never done    TETANUS VACCINE  Never done    Mammogram  06/14/2023    Lipid Panel  05/16/2027       Patient Instructions   RTC in 6 months for F/U or sooner if needed.    Keep appts with specialists as scheduled.    Instructed patient to report to nearest ER or call 911 if begins to have difficulty breathing, turning blue, chest pain, B/P < 80/60 or >170/100, palpitations, syncope, extreme weakness, or severe H/A. Patient verbalized understanding.      Patient Education       Low Cholesterol, Saturated Fat, and Trans Fat Diet   About this topic   Cholesterol, saturated fat, and trans fat are in many foods. These may raise your blood cholesterol levels. If your cholesterol is too high, this can cause health problems in your heart, liver, kidneys, and even your eyes. The key to lowering your risk of heart problems is to lower your bad fat intake.  Saturated fats and trans fats are the bad fats. These fats clog your arteries and raise your bad cholesterol. Saturated fats and trans fats are solid fats at room temperature. Saturated fats are animal fats. Trans fats are manmade fats. They add flavor to a lot of packaged foods. Staying away from saturated and trans fats will help your heart.  When you do eat foods with fat,  "make sure they have the good fats. Monounsaturated and polyunsaturated fats are good fats. These fats help raise your good cholesterol and protect your heart.  General   How to Lower Fat and Cholesterol in Your Diet   Read the labels of the foods you buy from the market to find out how much fat is present. Under 5% of total fat on a label means it is "low fat". Over 20% of total fat on a label means it is high fat.  Eat high fiber foods, like soluble fiber. This type of fiber helps lower cholesterol in the body. Choose oatmeal, fruits (like apples), beans, and nuts to get the most soluble fiber.  Eat foods high in omega-3 fatty acids like toshia seeds, walnuts, salmon, tuna, trout, herring, flaxseed, and soybeans. These foods help keep the heart healthy.  Limit your bad fat and oil intake.  Stay away from butter, stick margarine, shortening, lard, and palm and coconut oil. Pick plant-based spreads instead.  Limit mayonnaise, salad dressings, gravies, and sauces, unless it is made from low-fat ingredients.  Limit chocolate.  Do not eat high-fat processed foods like hot dogs, newell, sausage, ham and other luncheon meats high in fat, and some frozen foods. Pick fish, chicken, turkey, and lean meats instead.  Eat more dried beans, lentils, and tofu to get your protein.  Do not eat organ meats, like liver.  Choose nonfat or low-fat milk, yogurt, and cheese.  Use light or fat-free cream cheese and sour cream.  Eat lots of fruits and vegetables.  Pick whole grain breads, cereals, pastas, and rice.  Do not eat snacks that are high in fats like granola, cookies, pies, pastries, doughnuts, and croissants.  Stay away from deep fried foods.  Help When Cooking   Remove the fat portion of meats and the skin from poultry before cooking.  Bake, broil, grill, poach, or roast poultry, fish, and lean meats.  Drain and throw away the fat that drains out of meat as you cook it.  Try to add little or no fat to foods.  Use olive or canola " oil for cooking or baking.  Steam your vegetables.  Use herbs or no-oil marinades to flavor foods.         Who should use this diet?   This diet is for people who are at high risk of getting health problems like heart disease, high blood pressure, diabetes, and others. This diet is also good for all people to follow to keep your heart healthy.  What foods are good to eat?   Foods with good fats are:  Canola, peanut, and olive oil  Safflower, soybean, and corn oil  Walnuts, almonds, cashews, and peanuts  Pumpkin and sunflower seeds  [place] and tuna  Tofu  Soymilk  Avocado  What foods should be limited or avoided?   Stay away from these types of foods that have saturated fats:  Whole fat dairy products like cheese, ice cream, whole milk, and cream  Palm and coconut oils  High-fat meats like beef, lamb, poultry with the skin, newell, and sausage  Butter and lard  Stay away from these types of foods that may have trans fat:  Cookies, cakes, candy, doughnuts, baked goods, muffins, pizza dough, and pie crusts that are packaged  Fried foods  Frozen dinners  Chips and crackers  Microwave popcorn  Stick margarine and vegetable shortenings  Helpful tips   To help stay away from saturated fat:  Pick lean cuts of meat  Take the skin off chicken and turkey or pick skinless  Pick low-fat cheese, milk, and ice cream  Use liquid oils when cooking and baking, such as olive oil and canola oil  To help stay away from trans fat:  Look at your labels. Choose foods with 0% trans fat. Read the ingredient list. Avoid foods with partially hydrogenated oil in the ingredient list. This means there is trans fat in the product.  Where can I learn more?   American Heart Association   http://www.heart.org/HEARTORG/Conditions/Cholesterol/PreventionTreatmentofHighCholesterol/Know-Your-Fats_[facility]_305628_Article.jsp   Last Reviewed Date   2021-10-05  Consumer Information Use and Disclaimer   This information is not specific medical advice and does not  replace information you receive from your health care provider. This is only a brief summary of general information. It does NOT include all information about conditions, illnesses, injuries, tests, procedures, treatments, therapies, discharge instructions or life-style choices that may apply to you. You must talk with your health care provider for complete information about your health and treatment options. This information should not be used to decide whether or not to accept your health care providers advice, instructions or recommendations. Only your health care provider has the knowledge and training to provide advice that is right for you.   Copyright   Copyright © 2021 UpToDate, Inc. and its affiliates and/or licensors. All rights reserved.    Risks, benefits, and alternatives discussed with patient, Patient verbalized understanding of discussed plan of care. Asked patient if any further questions, answered no.    Future Appointments   Date Time Provider Department Center   3/29/2023  9:40 AM Bertha Stein NP [facility]  Naa Stein NP

## 2022-11-16 ENCOUNTER — PATIENT MESSAGE (OUTPATIENT)
Dept: ADMINISTRATIVE | Facility: HOSPITAL | Age: 57
End: 2022-11-16
Payer: COMMERCIAL

## 2023-03-22 ENCOUNTER — PATIENT MESSAGE (OUTPATIENT)
Dept: PRIMARY CARE CLINIC | Facility: CLINIC | Age: 58
End: 2023-03-22
Payer: COMMERCIAL

## 2023-03-26 LAB
ALBUMIN SERPL-MCNC: 4.4 G/DL (ref 3.6–5.1)
ALBUMIN/GLOB SERPL: 1.7 (CALC) (ref 1–2.5)
ALP SERPL-CCNC: 72 U/L (ref 37–153)
ALT SERPL-CCNC: 56 U/L (ref 6–29)
APPEARANCE UR: CLEAR
AST SERPL-CCNC: 33 U/L (ref 10–35)
BACTERIA #/AREA URNS HPF: ABNORMAL /HPF
BACTERIA UR CULT: NORMAL
BASOPHILS # BLD AUTO: 59 CELLS/UL (ref 0–200)
BASOPHILS NFR BLD AUTO: 1.2 %
BILIRUB SERPL-MCNC: 0.6 MG/DL (ref 0.2–1.2)
BILIRUB UR QL STRIP: NEGATIVE
BUN SERPL-MCNC: 15 MG/DL (ref 7–25)
BUN/CREAT SERPL: ABNORMAL (CALC) (ref 6–22)
CALCIUM SERPL-MCNC: 9.3 MG/DL (ref 8.6–10.4)
CHLORIDE SERPL-SCNC: 103 MMOL/L (ref 98–110)
CHOLEST SERPL-MCNC: 169 MG/DL
CHOLEST/HDLC SERPL: 2.4 (CALC)
CO2 SERPL-SCNC: 30 MMOL/L (ref 20–32)
COLOR UR: YELLOW
CREAT SERPL-MCNC: 0.68 MG/DL (ref 0.5–1.03)
EGFR: 103 ML/MIN/1.73M2
EOSINOPHIL # BLD AUTO: 59 CELLS/UL (ref 15–500)
EOSINOPHIL NFR BLD AUTO: 1.2 %
ERYTHROCYTE [DISTWIDTH] IN BLOOD BY AUTOMATED COUNT: 12 % (ref 11–15)
GLOBULIN SER CALC-MCNC: 2.6 G/DL (CALC) (ref 1.9–3.7)
GLUCOSE SERPL-MCNC: 98 MG/DL (ref 65–99)
GLUCOSE UR QL STRIP: NEGATIVE
HBA1C MFR BLD: 5.6 % OF TOTAL HGB
HCT VFR BLD AUTO: 41.8 % (ref 35–45)
HDLC SERPL-MCNC: 69 MG/DL
HGB BLD-MCNC: 14.1 G/DL (ref 11.7–15.5)
HGB UR QL STRIP: NEGATIVE
HYALINE CASTS #/AREA URNS LPF: ABNORMAL /LPF
KETONES UR QL STRIP: NEGATIVE
LDLC SERPL CALC-MCNC: 81 MG/DL (CALC)
LEUKOCYTE ESTERASE UR QL STRIP: ABNORMAL
LYMPHOCYTES # BLD AUTO: 2068 CELLS/UL (ref 850–3900)
LYMPHOCYTES NFR BLD AUTO: 42.2 %
MCH RBC QN AUTO: 31.7 PG (ref 27–33)
MCHC RBC AUTO-ENTMCNC: 33.7 G/DL (ref 32–36)
MCV RBC AUTO: 93.9 FL (ref 80–100)
MONOCYTES # BLD AUTO: 309 CELLS/UL (ref 200–950)
MONOCYTES NFR BLD AUTO: 6.3 %
NEUTROPHILS # BLD AUTO: 2406 CELLS/UL (ref 1500–7800)
NEUTROPHILS NFR BLD AUTO: 49.1 %
NITRITE UR QL STRIP: NEGATIVE
NONHDLC SERPL-MCNC: 100 MG/DL (CALC)
PH UR STRIP: 7.5 [PH] (ref 5–8)
PLATELET # BLD AUTO: 292 THOUSAND/UL (ref 140–400)
PMV BLD REES-ECKER: 10.1 FL (ref 7.5–12.5)
POTASSIUM SERPL-SCNC: 4.2 MMOL/L (ref 3.5–5.3)
PROT SERPL-MCNC: 7 G/DL (ref 6.1–8.1)
PROT UR QL STRIP: NEGATIVE
RBC # BLD AUTO: 4.45 MILLION/UL (ref 3.8–5.1)
RBC #/AREA URNS HPF: ABNORMAL /HPF
SERVICE CMNT-IMP: ABNORMAL
SODIUM SERPL-SCNC: 141 MMOL/L (ref 135–146)
SP GR UR STRIP: 1.02 (ref 1–1.03)
SQUAMOUS #/AREA URNS HPF: ABNORMAL /HPF
TRIGL SERPL-MCNC: 91 MG/DL
TSH SERPL-ACNC: 2.14 MIU/L
WBC # BLD AUTO: 4.9 THOUSAND/UL (ref 3.8–10.8)
WBC #/AREA URNS HPF: ABNORMAL /HPF

## 2023-03-29 ENCOUNTER — OFFICE VISIT (OUTPATIENT)
Dept: PRIMARY CARE CLINIC | Facility: CLINIC | Age: 58
End: 2023-03-29
Payer: COMMERCIAL

## 2023-03-29 VITALS
RESPIRATION RATE: 18 BRPM | HEART RATE: 70 BPM | OXYGEN SATURATION: 100 % | WEIGHT: 134.19 LBS | HEIGHT: 62 IN | SYSTOLIC BLOOD PRESSURE: 96 MMHG | DIASTOLIC BLOOD PRESSURE: 63 MMHG | BODY MASS INDEX: 24.69 KG/M2

## 2023-03-29 DIAGNOSIS — E78.00 PURE HYPERCHOLESTEROLEMIA: ICD-10-CM

## 2023-03-29 DIAGNOSIS — M25.59 PAIN IN OTHER JOINT: ICD-10-CM

## 2023-03-29 DIAGNOSIS — M25.449 SWELLING OF FINGER JOINT, UNSPECIFIED LATERALITY: ICD-10-CM

## 2023-03-29 DIAGNOSIS — Z00.00 ANNUAL PHYSICAL EXAM: Primary | ICD-10-CM

## 2023-03-29 DIAGNOSIS — Z12.31 BREAST CANCER SCREENING BY MAMMOGRAM: ICD-10-CM

## 2023-03-29 PROCEDURE — 3078F PR MOST RECENT DIASTOLIC BLOOD PRESSURE < 80 MM HG: ICD-10-PCS | Mod: CPTII,S$GLB,, | Performed by: NURSE PRACTITIONER

## 2023-03-29 PROCEDURE — 3008F PR BODY MASS INDEX (BMI) DOCUMENTED: ICD-10-PCS | Mod: CPTII,S$GLB,, | Performed by: NURSE PRACTITIONER

## 2023-03-29 PROCEDURE — 1159F MED LIST DOCD IN RCRD: CPT | Mod: CPTII,S$GLB,, | Performed by: NURSE PRACTITIONER

## 2023-03-29 PROCEDURE — 3078F DIAST BP <80 MM HG: CPT | Mod: CPTII,S$GLB,, | Performed by: NURSE PRACTITIONER

## 2023-03-29 PROCEDURE — 3074F SYST BP LT 130 MM HG: CPT | Mod: CPTII,S$GLB,, | Performed by: NURSE PRACTITIONER

## 2023-03-29 PROCEDURE — 99396 PREV VISIT EST AGE 40-64: CPT | Mod: S$GLB,,, | Performed by: NURSE PRACTITIONER

## 2023-03-29 PROCEDURE — 3008F BODY MASS INDEX DOCD: CPT | Mod: CPTII,S$GLB,, | Performed by: NURSE PRACTITIONER

## 2023-03-29 PROCEDURE — 3074F PR MOST RECENT SYSTOLIC BLOOD PRESSURE < 130 MM HG: ICD-10-PCS | Mod: CPTII,S$GLB,, | Performed by: NURSE PRACTITIONER

## 2023-03-29 PROCEDURE — 3044F PR MOST RECENT HEMOGLOBIN A1C LEVEL <7.0%: ICD-10-PCS | Mod: CPTII,S$GLB,, | Performed by: NURSE PRACTITIONER

## 2023-03-29 PROCEDURE — 1160F PR REVIEW ALL MEDS BY PRESCRIBER/CLIN PHARMACIST DOCUMENTED: ICD-10-PCS | Mod: CPTII,S$GLB,, | Performed by: NURSE PRACTITIONER

## 2023-03-29 PROCEDURE — 1159F PR MEDICATION LIST DOCUMENTED IN MEDICAL RECORD: ICD-10-PCS | Mod: CPTII,S$GLB,, | Performed by: NURSE PRACTITIONER

## 2023-03-29 PROCEDURE — 3044F HG A1C LEVEL LT 7.0%: CPT | Mod: CPTII,S$GLB,, | Performed by: NURSE PRACTITIONER

## 2023-03-29 PROCEDURE — 99396 PR PREVENTIVE VISIT,EST,40-64: ICD-10-PCS | Mod: S$GLB,,, | Performed by: NURSE PRACTITIONER

## 2023-03-29 PROCEDURE — 1160F RVW MEDS BY RX/DR IN RCRD: CPT | Mod: CPTII,S$GLB,, | Performed by: NURSE PRACTITIONER

## 2023-03-29 NOTE — PATIENT INSTRUCTIONS
"RTC in 6 months for F/U or sooner if needed.    Patient Education       Low Cholesterol, Saturated Fat, and Trans Fat Diet   About this topic   Cholesterol, saturated fat, and trans fat are in many foods. These may raise your blood cholesterol levels. If your cholesterol is too high, this can cause health problems in your heart, liver, kidneys, and even your eyes. The key to lowering your risk of heart problems is to lower your bad fat intake.  Saturated fats and trans fats are the bad fats. These fats clog your arteries and raise your bad cholesterol. Saturated fats and trans fats are solid fats at room temperature. Saturated fats are animal fats. Trans fats are manmade fats. They add flavor to a lot of packaged foods. Staying away from saturated and trans fats will help your heart.  When you do eat foods with fat, make sure they have the good fats. Monounsaturated and polyunsaturated fats are good fats. These fats help raise your good cholesterol and protect your heart.  General   How to Lower Fat and Cholesterol in Your Diet   Read the labels of the foods you buy from the market to find out how much fat is present. Under 5% of total fat on a label means it is "low fat". Over 20% of total fat on a label means it is high fat.  Eat high fiber foods, like soluble fiber. This type of fiber helps lower cholesterol in the body. Choose oatmeal, fruits (like apples), beans, and nuts to get the most soluble fiber.  Eat foods high in omega-3 fatty acids like toshia seeds, walnuts, salmon, tuna, trout, herring, flaxseed, and soybeans. These foods help keep the heart healthy.  Limit your bad fat and oil intake.  Stay away from butter, stick margarine, shortening, lard, and palm and coconut oil. Pick plant-based spreads instead.  Limit mayonnaise, salad dressings, gravies, and sauces, unless it is made from low-fat ingredients.  Limit chocolate.  Do not eat high-fat processed foods like hot dogs, newell, sausage, ham and other " luncheon meats high in fat, and some frozen foods. Pick fish, chicken, turkey, and lean meats instead.  Eat more dried beans, lentils, and tofu to get your protein.  Do not eat organ meats, like liver.  Choose nonfat or low-fat milk, yogurt, and cheese.  Use light or fat-free cream cheese and sour cream.  Eat lots of fruits and vegetables.  Pick whole grain breads, cereals, pastas, and rice.  Do not eat snacks that are high in fats like granola, cookies, pies, pastries, doughnuts, and croissants.  Stay away from deep fried foods.  Help When Cooking   Remove the fat portion of meats and the skin from poultry before cooking.  Bake, broil, grill, poach, or roast poultry, fish, and lean meats.  Drain and throw away the fat that drains out of meat as you cook it.  Try to add little or no fat to foods.  Use olive or canola oil for cooking or baking.  Steam your vegetables.  Use herbs or no-oil marinades to flavor foods.         Who should use this diet?   This diet is for people who are at high risk of getting health problems like heart disease, high blood pressure, diabetes, and others. This diet is also good for all people to follow to keep your heart healthy.  What foods are good to eat?   Foods with good fats are:  Canola, peanut, and olive oil  Safflower, soybean, and corn oil  Walnuts, almonds, cashews, and peanuts  Pumpkin and sunflower seeds  [place] and tuna  Tofu  Soymilk  Avocado  What foods should be limited or avoided?   Stay away from these types of foods that have saturated fats:  Whole fat dairy products like cheese, ice cream, whole milk, and cream  Palm and coconut oils  High-fat meats like beef, lamb, poultry with the skin, newell, and sausage  Butter and lard  Stay away from these types of foods that may have trans fat:  Cookies, cakes, candy, doughnuts, baked goods, muffins, pizza dough, and pie crusts that are packaged  Fried foods  Frozen dinners  Chips and crackers  Microwave popcorn  Stick margarine  and vegetable shortenings  Helpful tips   To help stay away from saturated fat:  Pick lean cuts of meat  Take the skin off chicken and turkey or pick skinless  Pick low-fat cheese, milk, and ice cream  Use liquid oils when cooking and baking, such as olive oil and canola oil  To help stay away from trans fat:  Look at your labels. Choose foods with 0% trans fat. Read the ingredient list. Avoid foods with partially hydrogenated oil in the ingredient list. This means there is trans fat in the product.  Where can I learn more?   American Heart Association   http://www.heart.org/HEARTORG/Conditions/Cholesterol/PreventionTreatmentofHighCholesterol/Know-Your-Fats_[facility]_305628_Article.jsp   Last Reviewed Date   2021-10-05  Consumer Information Use and Disclaimer   This information is not specific medical advice and does not replace information you receive from your health care provider. This is only a brief summary of general information. It does NOT include all information about conditions, illnesses, injuries, tests, procedures, treatments, therapies, discharge instructions or life-style choices that may apply to you. You must talk with your health care provider for complete information about your health and treatment options. This information should not be used to decide whether or not to accept your health care providers advice, instructions or recommendations. Only your health care provider has the knowledge and training to provide advice that is right for you.   Copyright   Copyright © 2021 UpToDate, Inc. and its affiliates and/or licensors. All rights reserved.

## 2023-03-29 NOTE — PROGRESS NOTES
Subjective:       Patient ID: Shima Wood is a 57 y.o. female.    Chief Complaint: Follow-up    HPI: Shima is a 57 y.o. female who presents for annual visit. Annual labs done a couple weeks ago and all results are normal.    C/O pain to her wrists, hands, and knees on most days which she notices is worse in the morning. She denies any family history of an autoimmune disorder.    HLD - controlled well with STATIN daily, LDL improved greatly, 81. She has been trying to eat healthier but does not exercise.    She does take a daily women's vitamin, turmeric, and magnesium.    Mammogram done last June and was normal, will order today.    She has been in menopause for 6 years. Referred to Dr. Delvalle for well woman, she was seen in August with PAP smear done which was normal.    Had a Colonoscopy done last year at [facility] and had some polyps removed and told to repeat in 3 years.    Denies smoking or drinking.             Past Medical History:   Diagnosis Date    Hyperlipidemia        History reviewed. No pertinent surgical history.    History reviewed. No pertinent family history.    Social History     Tobacco Use    Smoking status: Never    Smokeless tobacco: Never       Patient Active Problem List   Diagnosis    Pure hypercholesterolemia    Seasonal allergies       Immunization History   Administered Date(s) Administered    COVID-19, MRNA, LN-S, PF (MODERNA FULL 0.5 ML DOSE) 03/30/2021, 04/27/2021, 04/12/2022           Review of Systems   Constitutional:  Negative for activity change, appetite change, chills, diaphoresis, fatigue and fever.   HENT:  Negative for congestion, ear pain, sinus pain, tinnitus and trouble swallowing.    Eyes:  Negative for pain and visual disturbance.   Respiratory:  Negative for cough, chest tightness, shortness of breath and wheezing.    Cardiovascular:  Negative for chest pain, palpitations and leg swelling.   Gastrointestinal:  Negative for abdominal distention, abdominal pain, blood  "in stool, constipation, diarrhea, nausea and vomiting.   Endocrine: Negative for cold intolerance, heat intolerance, polydipsia, polyphagia and polyuria.   Genitourinary:  Negative for decreased urine volume, dysuria, frequency, hematuria and urgency.   Musculoskeletal:  Positive for arthralgias (multiple joints). Negative for back pain, gait problem and neck pain.   Skin:  Negative for color change and rash.   Neurological:  Negative for dizziness, syncope, weakness, light-headedness, numbness and headaches.   Hematological:  Negative for adenopathy. Does not bruise/bleed easily.   Psychiatric/Behavioral:  Negative for behavioral problems, confusion and dysphoric mood. The patient is not nervous/anxious.    Objective:     Vitals:    03/29/23 0942   BP: 96/63   BP Location: Left arm   Patient Position: Sitting   BP Method: Medium (Automatic)   Pulse: 70   Resp: 18   SpO2: 100%   Weight: 60.9 kg (134 lb 3.2 oz)   Height: 5' 2" (1.575 m)       Physical Exam  Vitals and nursing note reviewed.   Constitutional:       General: She is not in acute distress.     Appearance: Normal appearance. She is not diaphoretic.   HENT:      Head: Normocephalic and atraumatic.      Mouth/Throat:      Mouth: Mucous membranes are moist.      Pharynx: Oropharynx is clear.   Eyes:      General:         Right eye: No discharge.         Left eye: No discharge.      Conjunctiva/sclera: Conjunctivae normal.      Pupils: Pupils are equal, round, and reactive to light.   Neck:      Thyroid: No thyromegaly or thyroid tenderness.   Cardiovascular:      Rate and Rhythm: Normal rate and regular rhythm.      Pulses: Normal pulses.      Heart sounds: Normal heart sounds.   Pulmonary:      Effort: Pulmonary effort is normal.      Breath sounds: Normal breath sounds. No stridor. No wheezing or rales.   Abdominal:      General: Bowel sounds are normal. There is no distension.      Palpations: Abdomen is soft.      Tenderness: There is no abdominal " tenderness.   Musculoskeletal:         General: Tenderness (multiple joints) present. Normal range of motion.      Cervical back: Normal range of motion and neck supple.      Right lower leg: No edema.      Left lower leg: No edema.   Lymphadenopathy:      Cervical: No cervical adenopathy.   Skin:     General: Skin is warm and dry.      Capillary Refill: Capillary refill takes less than 2 seconds.      Findings: No rash.   Neurological:      General: No focal deficit present.      Mental Status: She is alert and oriented to person, place, and time.   Psychiatric:         Mood and Affect: Mood and affect normal.         Behavior: Behavior normal. Behavior is cooperative.         Thought Content: Thought content normal.         Judgment: Judgment normal.       Office Visit on 03/29/2023   Component Date Value Ref Range Status    BIENVENIDO 03/29/2023 POSITIVE (A)  NEGATIVE Final    BIENVENIDO Titer 03/29/2023 1:40 (H)  titer Final    BIENVENIDO Pattern 03/29/2023 Nuclear, Speckled (A)   Final    Rheumatoid Factor 03/29/2023 <14  <14 IU/mL Final   Office Visit on 09/29/2022   Component Date Value Ref Range Status    WBC 03/24/2023 4.9  3.8 - 10.8 Thousand/uL Final    RBC 03/24/2023 4.45  3.80 - 5.10 Million/uL Final    Hemoglobin 03/24/2023 14.1  11.7 - 15.5 g/dL Final    Hematocrit 03/24/2023 41.8  35.0 - 45.0 % Final    MCV 03/24/2023 93.9  80.0 - 100.0 fL Final    MCH 03/24/2023 31.7  27.0 - 33.0 pg Final    MCHC 03/24/2023 33.7  32.0 - 36.0 g/dL Final    RDW 03/24/2023 12.0  11.0 - 15.0 % Final    Platelets 03/24/2023 292  140 - 400 Thousand/uL Final    MPV 03/24/2023 10.1  7.5 - 12.5 fL Final    Neutrophils, Abs 03/24/2023 2,406  1,500 - 7,800 cells/uL Final    Lymph # 03/24/2023 2,068  850 - 3,900 cells/uL Final    Mono # 03/24/2023 309  200 - 950 cells/uL Final    Eos # 03/24/2023 59  15 - 500 cells/uL Final    Baso # 03/24/2023 59  0 - 200 cells/uL Final    Neutrophils Relative 03/24/2023 49.1  % Final    Lymph % 03/24/2023 42.2  %  Final    Mono % 03/24/2023 6.3  % Final    Eosinophil % 03/24/2023 1.2  % Final    Basophil % 03/24/2023 1.2  % Final    Glucose 03/24/2023 98  65 - 99 mg/dL Final    BUN 03/24/2023 15  7 - 25 mg/dL Final    Creatinine 03/24/2023 0.68  0.50 - 1.03 mg/dL Final    eGFR 03/24/2023 103  > OR = 60 mL/min/1.73m2 Final    BUN/Creatinine Ratio 03/24/2023 NOT APPLICABLE  6 - 22 (calc) Final    Sodium 03/24/2023 141  135 - 146 mmol/L Final    Potassium 03/24/2023 4.2  3.5 - 5.3 mmol/L Final    Chloride 03/24/2023 103  98 - 110 mmol/L Final    CO2 03/24/2023 30  20 - 32 mmol/L Final    Calcium 03/24/2023 9.3  8.6 - 10.4 mg/dL Final    Total Protein 03/24/2023 7.0  6.1 - 8.1 g/dL Final    Albumin 03/24/2023 4.4  3.6 - 5.1 g/dL Final    Globulin, Total 03/24/2023 2.6  1.9 - 3.7 g/dL (calc) Final    Albumin/Globulin Ratio 03/24/2023 1.7  1.0 - 2.5 (calc) Final    Total Bilirubin 03/24/2023 0.6  0.2 - 1.2 mg/dL Final    Alkaline Phosphatase 03/24/2023 72  37 - 153 U/L Final    AST 03/24/2023 33  10 - 35 U/L Final    ALT 03/24/2023 56 (H)  6 - 29 U/L Final    Hemoglobin A1C 03/24/2023 5.6  <5.7 % of total Hgb Final    Cholesterol 03/24/2023 169  <200 mg/dL Final    HDL 03/24/2023 69  > OR = 50 mg/dL Final    Triglycerides 03/24/2023 91  <150 mg/dL Final    LDL Cholesterol 03/24/2023 81  mg/dL (calc) Final    HDL/Cholesterol Ratio 03/24/2023 2.4  <5.0 (calc) Final    Non HDL Chol. (LDL+VLDL) 03/24/2023 100  <130 mg/dL (calc) Final    TSH w/reflex to FT4 03/24/2023 2.14  mIU/L Final    Color, UA 03/24/2023 YELLOW  YELLOW Final    Appearance, UA 03/24/2023 CLEAR  CLEAR Final    Specific Gravity, UA 03/24/2023 1.020  1.001 - 1.035 Final    pH, UA 03/24/2023 7.5  5.0 - 8.0 Final    Glucose, UA 03/24/2023 NEGATIVE  NEGATIVE Final    Bilirubin, UA 03/24/2023 NEGATIVE  NEGATIVE Final    Ketones, UA 03/24/2023 NEGATIVE  NEGATIVE Final    Occult Blood UA 03/24/2023 NEGATIVE  NEGATIVE Final    Protein, UA 03/24/2023 NEGATIVE  NEGATIVE Final     Nitrite, UA 03/24/2023 NEGATIVE  NEGATIVE Final    Leukocytes, UA 03/24/2023 2+ (A)  NEGATIVE Final    WBC Casts, UA 03/24/2023 6-10 (A)  < OR = 5 /HPF Final    RBC Casts, UA 03/24/2023 0-2  < OR = 2 /HPF Final    Squam Epithel, UA 03/24/2023 0-5  < OR = 5 /HPF Final    Bacteria, UA 03/24/2023 NONE SEEN  NONE SEEN /HPF Final    Hyaline Casts, UA 03/24/2023 NONE SEEN  NONE SEEN /LPF Final    Service Cmt: 03/24/2023    Final    Urine Culture, Routine 03/24/2023    Final         Assessment:      1. Annual physical exam    2. Pure hypercholesterolemia    3. Breast cancer screening by mammogram    4. Pain in other joint    5. Swelling of finger joint, unspecified laterality          Plan:     Annual physical exam  Comments:  Lab results reviewed and discussed in detail with full understanding    Pure hypercholesterolemia    Breast cancer screening by mammogram  -     Mammo Digital Screening Bilat; Future; Expected date: 06/01/2023    Pain in other joint  -     BIENVENIDO Screen w/Reflex; Future; Expected date: 03/29/2023  -     Rheumatoid Factor; Future; Expected date: 03/29/2023    Swelling of finger joint, unspecified laterality  -     BIENVENIDO Screen w/Reflex; Future; Expected date: 03/29/2023  -     Rheumatoid Factor; Future; Expected date: 03/29/2023         Current Outpatient Medications   Medication Sig Dispense Refill    atorvastatin (LIPITOR) 20 MG tablet Take 1 tablet (20 mg total) by mouth every evening. 30 tablet 11    desloratadine (CLARINEX) 5 mg tablet Take 1 tablet (5 mg total) by mouth once daily. (Patient not taking: Reported on 3/29/2023) 30 tablet 2     No current facility-administered medications for this visit.       There are no discontinued medications.    Health Maintenance   Topic Date Due    Hepatitis C Screening  Never done    TETANUS VACCINE  Never done    Mammogram  06/14/2023    Lipid Panel  03/24/2028       Patient Instructions   RTC in 6 months for F/U or sooner if needed.    Patient Education      "  Low Cholesterol, Saturated Fat, and Trans Fat Diet   About this topic   Cholesterol, saturated fat, and trans fat are in many foods. These may raise your blood cholesterol levels. If your cholesterol is too high, this can cause health problems in your heart, liver, kidneys, and even your eyes. The key to lowering your risk of heart problems is to lower your bad fat intake.  Saturated fats and trans fats are the bad fats. These fats clog your arteries and raise your bad cholesterol. Saturated fats and trans fats are solid fats at room temperature. Saturated fats are animal fats. Trans fats are manmade fats. They add flavor to a lot of packaged foods. Staying away from saturated and trans fats will help your heart.  When you do eat foods with fat, make sure they have the good fats. Monounsaturated and polyunsaturated fats are good fats. These fats help raise your good cholesterol and protect your heart.  General   How to Lower Fat and Cholesterol in Your Diet   Read the labels of the foods you buy from the market to find out how much fat is present. Under 5% of total fat on a label means it is "low fat". Over 20% of total fat on a label means it is high fat.  Eat high fiber foods, like soluble fiber. This type of fiber helps lower cholesterol in the body. Choose oatmeal, fruits (like apples), beans, and nuts to get the most soluble fiber.  Eat foods high in omega-3 fatty acids like toshia seeds, walnuts, salmon, tuna, trout, herring, flaxseed, and soybeans. These foods help keep the heart healthy.  Limit your bad fat and oil intake.  Stay away from butter, stick margarine, shortening, lard, and palm and coconut oil. Pick plant-based spreads instead.  Limit mayonnaise, salad dressings, gravies, and sauces, unless it is made from low-fat ingredients.  Limit chocolate.  Do not eat high-fat processed foods like hot dogs, newell, sausage, ham and other luncheon meats high in fat, and some frozen foods. Pick fish, chicken, " turkey, and lean meats instead.  Eat more dried beans, lentils, and tofu to get your protein.  Do not eat organ meats, like liver.  Choose nonfat or low-fat milk, yogurt, and cheese.  Use light or fat-free cream cheese and sour cream.  Eat lots of fruits and vegetables.  Pick whole grain breads, cereals, pastas, and rice.  Do not eat snacks that are high in fats like granola, cookies, pies, pastries, doughnuts, and croissants.  Stay away from deep fried foods.  Help When Cooking   Remove the fat portion of meats and the skin from poultry before cooking.  Bake, broil, grill, poach, or roast poultry, fish, and lean meats.  Drain and throw away the fat that drains out of meat as you cook it.  Try to add little or no fat to foods.  Use olive or canola oil for cooking or baking.  Steam your vegetables.  Use herbs or no-oil marinades to flavor foods.         Who should use this diet?   This diet is for people who are at high risk of getting health problems like heart disease, high blood pressure, diabetes, and others. This diet is also good for all people to follow to keep your heart healthy.  What foods are good to eat?   Foods with good fats are:  Canola, peanut, and olive oil  Safflower, soybean, and corn oil  Walnuts, almonds, cashews, and peanuts  Pumpkin and sunflower seeds  [place] and tuna  Tofu  Soymilk  Avocado  What foods should be limited or avoided?   Stay away from these types of foods that have saturated fats:  Whole fat dairy products like cheese, ice cream, whole milk, and cream  Palm and coconut oils  High-fat meats like beef, lamb, poultry with the skin, newell, and sausage  Butter and lard  Stay away from these types of foods that may have trans fat:  Cookies, cakes, candy, doughnuts, baked goods, muffins, pizza dough, and pie crusts that are packaged  Fried foods  Frozen dinners  Chips and crackers  Microwave popcorn  Stick margarine and vegetable shortenings  Helpful tips   To help stay away from  saturated fat:  Pick lean cuts of meat  Take the skin off chicken and turkey or pick skinless  Pick low-fat cheese, milk, and ice cream  Use liquid oils when cooking and baking, such as olive oil and canola oil  To help stay away from trans fat:  Look at your labels. Choose foods with 0% trans fat. Read the ingredient list. Avoid foods with partially hydrogenated oil in the ingredient list. This means there is trans fat in the product.  Where can I learn more?   American Heart Association   http://www.heart.org/HEARTORG/Conditions/Cholesterol/PreventionTreatmentofHighCholesterol/Know-Your-Fats_[facility]_305628_Article.jsp   Last Reviewed Date   2021-10-05  Consumer Information Use and Disclaimer   This information is not specific medical advice and does not replace information you receive from your health care provider. This is only a brief summary of general information. It does NOT include all information about conditions, illnesses, injuries, tests, procedures, treatments, therapies, discharge instructions or life-style choices that may apply to you. You must talk with your health care provider for complete information about your health and treatment options. This information should not be used to decide whether or not to accept your health care providers advice, instructions or recommendations. Only your health care provider has the knowledge and training to provide advice that is right for you.   Copyright   Copyright © 2021 [employer] and its affiliates and/or licensors. All rights reserved.          Risks, benefits, and alternatives discussed with patient, Patient verbalized understanding of discussed plan of care. Asked patient if any further questions, answered no.    Future Appointments   Date Time Provider Department Center   9/5/2023  9:20 AM [facility]               Yenifer Stein NP

## 2023-03-31 ENCOUNTER — PATIENT MESSAGE (OUTPATIENT)
Dept: FAMILY MEDICINE | Facility: CLINIC | Age: 58
End: 2023-03-31
Payer: COMMERCIAL

## 2023-03-31 LAB
ANA PAT SER IF-IMP: ABNORMAL
ANA SER QL IF: POSITIVE
ANA TITR SER IF: ABNORMAL TITER
RHEUMATOID FACT SERPL-ACNC: <14 IU/ML

## 2023-04-04 ENCOUNTER — PATIENT MESSAGE (OUTPATIENT)
Dept: PRIMARY CARE CLINIC | Facility: CLINIC | Age: 58
End: 2023-04-04
Payer: COMMERCIAL

## 2023-04-12 DIAGNOSIS — M25.449 SWELLING OF FINGER JOINT, UNSPECIFIED LATERALITY: ICD-10-CM

## 2023-04-12 DIAGNOSIS — R76.8 POSITIVE ANA (ANTINUCLEAR ANTIBODY): ICD-10-CM

## 2023-04-12 DIAGNOSIS — M25.59 PAIN IN OTHER JOINT: Primary | ICD-10-CM

## 2023-04-12 NOTE — PROGRESS NOTES
Please notify patient her autoimmune screening test came back positive so I have ordered a referral to our Rheumatologist, Dr. Gutierrez for further testing. Thanks.

## 2023-04-13 ENCOUNTER — TELEPHONE (OUTPATIENT)
Dept: PRIMARY CARE CLINIC | Facility: CLINIC | Age: 58
End: 2023-04-13
Payer: COMMERCIAL

## 2023-04-13 NOTE — TELEPHONE ENCOUNTER
----- Message from Bertha Stein NP sent at 4/12/2023  2:38 PM CDT -----  Please notify patient her autoimmune screening test came back positive so I have ordered a referral to our Rheumatologist, Dr. Gutierrez for further testing. Thanks.

## 2023-05-18 ENCOUNTER — PATIENT MESSAGE (OUTPATIENT)
Dept: ADMINISTRATIVE | Facility: HOSPITAL | Age: 58
End: 2023-05-18
Payer: COMMERCIAL

## 2023-05-22 DIAGNOSIS — Z12.11 SCREENING FOR COLON CANCER: ICD-10-CM

## 2023-05-31 ENCOUNTER — TELEPHONE (OUTPATIENT)
Dept: RHEUMATOLOGY | Facility: CLINIC | Age: 58
End: 2023-05-31
Payer: COMMERCIAL

## 2023-05-31 NOTE — TELEPHONE ENCOUNTER
Called patient to let her know that Dr. Gutierrez is leaving [facility] unexpectedly and he asked that all of his New patient apt be cancelled. Patient voiced understanding.

## 2023-06-10 LAB — HEMOCCULT STL QL IA: NEGATIVE

## 2023-09-05 ENCOUNTER — OFFICE VISIT (OUTPATIENT)
Dept: PRIMARY CARE CLINIC | Facility: CLINIC | Age: 58
End: 2023-09-05
Payer: COMMERCIAL

## 2023-09-05 VITALS
WEIGHT: 129 LBS | SYSTOLIC BLOOD PRESSURE: 97 MMHG | HEIGHT: 62 IN | BODY MASS INDEX: 23.74 KG/M2 | OXYGEN SATURATION: 98 % | DIASTOLIC BLOOD PRESSURE: 61 MMHG | HEART RATE: 76 BPM | RESPIRATION RATE: 18 BRPM

## 2023-09-05 DIAGNOSIS — R35.0 URINE FREQUENCY: ICD-10-CM

## 2023-09-05 DIAGNOSIS — E55.9 VITAMIN D DEFICIENCY: ICD-10-CM

## 2023-09-05 DIAGNOSIS — Z13.29 THYROID DISORDER SCREEN: ICD-10-CM

## 2023-09-05 DIAGNOSIS — Z13.1 DIABETES MELLITUS SCREENING: ICD-10-CM

## 2023-09-05 DIAGNOSIS — M25.59 PAIN IN OTHER JOINT: ICD-10-CM

## 2023-09-05 DIAGNOSIS — E78.49 OTHER HYPERLIPIDEMIA: Primary | ICD-10-CM

## 2023-09-05 DIAGNOSIS — E78.00 PURE HYPERCHOLESTEROLEMIA: ICD-10-CM

## 2023-09-05 DIAGNOSIS — Z00.00 ANNUAL PHYSICAL EXAM: ICD-10-CM

## 2023-09-05 DIAGNOSIS — M25.449 SWELLING OF FINGER JOINT, UNSPECIFIED LATERALITY: ICD-10-CM

## 2023-09-05 DIAGNOSIS — J30.2 SEASONAL ALLERGIES: ICD-10-CM

## 2023-09-05 DIAGNOSIS — K21.00 GASTROESOPHAGEAL REFLUX DISEASE WITH ESOPHAGITIS WITHOUT HEMORRHAGE: ICD-10-CM

## 2023-09-05 DIAGNOSIS — R76.8 POSITIVE ANA (ANTINUCLEAR ANTIBODY): ICD-10-CM

## 2023-09-05 PROCEDURE — 99214 PR OFFICE/OUTPT VISIT, EST, LEVL IV, 30-39 MIN: ICD-10-PCS | Mod: S$GLB,,, | Performed by: NURSE PRACTITIONER

## 2023-09-05 PROCEDURE — 3078F DIAST BP <80 MM HG: CPT | Mod: CPTII,S$GLB,, | Performed by: NURSE PRACTITIONER

## 2023-09-05 PROCEDURE — 1160F RVW MEDS BY RX/DR IN RCRD: CPT | Mod: CPTII,S$GLB,, | Performed by: NURSE PRACTITIONER

## 2023-09-05 PROCEDURE — 3044F HG A1C LEVEL LT 7.0%: CPT | Mod: CPTII,S$GLB,, | Performed by: NURSE PRACTITIONER

## 2023-09-05 PROCEDURE — 3078F PR MOST RECENT DIASTOLIC BLOOD PRESSURE < 80 MM HG: ICD-10-PCS | Mod: CPTII,S$GLB,, | Performed by: NURSE PRACTITIONER

## 2023-09-05 PROCEDURE — 99214 OFFICE O/P EST MOD 30 MIN: CPT | Mod: S$GLB,,, | Performed by: NURSE PRACTITIONER

## 2023-09-05 PROCEDURE — 3008F BODY MASS INDEX DOCD: CPT | Mod: CPTII,S$GLB,, | Performed by: NURSE PRACTITIONER

## 2023-09-05 PROCEDURE — 3008F PR BODY MASS INDEX (BMI) DOCUMENTED: ICD-10-PCS | Mod: CPTII,S$GLB,, | Performed by: NURSE PRACTITIONER

## 2023-09-05 PROCEDURE — 1159F MED LIST DOCD IN RCRD: CPT | Mod: CPTII,S$GLB,, | Performed by: NURSE PRACTITIONER

## 2023-09-05 PROCEDURE — 3074F PR MOST RECENT SYSTOLIC BLOOD PRESSURE < 130 MM HG: ICD-10-PCS | Mod: CPTII,S$GLB,, | Performed by: NURSE PRACTITIONER

## 2023-09-05 PROCEDURE — 1160F PR REVIEW ALL MEDS BY PRESCRIBER/CLIN PHARMACIST DOCUMENTED: ICD-10-PCS | Mod: CPTII,S$GLB,, | Performed by: NURSE PRACTITIONER

## 2023-09-05 PROCEDURE — 3074F SYST BP LT 130 MM HG: CPT | Mod: CPTII,S$GLB,, | Performed by: NURSE PRACTITIONER

## 2023-09-05 PROCEDURE — 3044F PR MOST RECENT HEMOGLOBIN A1C LEVEL <7.0%: ICD-10-PCS | Mod: CPTII,S$GLB,, | Performed by: NURSE PRACTITIONER

## 2023-09-05 PROCEDURE — 1159F PR MEDICATION LIST DOCUMENTED IN MEDICAL RECORD: ICD-10-PCS | Mod: CPTII,S$GLB,, | Performed by: NURSE PRACTITIONER

## 2023-09-05 RX ORDER — PANTOPRAZOLE SODIUM 20 MG/1
20 TABLET, DELAYED RELEASE ORAL DAILY
Qty: 30 TABLET | Refills: 0 | Status: SHIPPED | OUTPATIENT
Start: 2023-09-05

## 2023-09-05 RX ORDER — ATORVASTATIN CALCIUM 20 MG/1
20 TABLET, FILM COATED ORAL NIGHTLY
Qty: 90 TABLET | Refills: 3 | Status: SHIPPED | OUTPATIENT
Start: 2023-09-05

## 2023-09-05 NOTE — PROGRESS NOTES
Subjective:       Patient ID: Shima Wood is a 58 y.o. female.    Chief Complaint: Follow-up    HPI: Shima is a 58 y.o. female who presents for a F/U visit.     Annual labs done at her last visit and they were all normal.    Mrs. Wood has been suffering with C/O pain to her wrists, hands, and knees on most days which she notices is worse in the morning. She denies any family history of an autoimmune disorder. BIENVENIDO test came back positive so she was referred to Dr. Gutierrez for further evaluation but needs a new referral now due to him being gone, will refer to Dr. Mcneal.    HLD - controlled well with STATIN daily, LDL improved greatly at 81 on last labs. She has been trying to eat healthier but does not exercise.    She does take a daily women's vitamin, turmeric, and magnesium.    Mammogram due, previously ordered and she says she is scheduled later this month to have it done.     She has been in menopause for 6 years. Referred to Dr. Devlalle for well woman, she was seen last in August with PAP smear done which was normal. Next appt is later this month.    Had a Colonoscopy done in 2022 at [facility] and had some polyps removed and told to repeat in 3 years, due in 2025. Home fecal test also done this past May and was negative.    Denies smoking or drinking.               Past Medical History:   Diagnosis Date    Hyperlipidemia        History reviewed. No pertinent surgical history.    History reviewed. No pertinent family history.    Social History     Tobacco Use    Smoking status: Never    Smokeless tobacco: Never       Patient Active Problem List   Diagnosis    Other hyperlipidemia    Seasonal allergies       Immunization History   Administered Date(s) Administered    COVID-19, MRNA, LN-S, PF (MODERNA FULL 0.5 ML DOSE) 03/30/2021, 04/27/2021, 04/12/2022           Review of Systems   Constitutional:  Negative for activity change, appetite change, chills, diaphoresis, fatigue and fever.   HENT:  Negative for  "tinnitus and trouble swallowing.    Eyes:  Negative for visual disturbance.   Respiratory:  Negative for cough, chest tightness, shortness of breath and wheezing.    Cardiovascular:  Negative for chest pain, palpitations and leg swelling.   Gastrointestinal:  Negative for abdominal distention, abdominal pain, blood in stool, constipation, diarrhea, nausea and vomiting.   Genitourinary:  Negative for decreased urine volume, dysuria, frequency, hematuria and urgency.   Musculoskeletal:  Negative for gait problem.   Skin:  Negative for color change and rash.   Neurological:  Negative for dizziness, syncope, weakness, light-headedness, numbness and headaches.   Psychiatric/Behavioral:  Negative for behavioral problems, confusion and dysphoric mood. The patient is not nervous/anxious.      Objective:     Vitals:    09/05/23 0921   BP: 97/61   BP Location: Right arm   Patient Position: Sitting   BP Method: Medium (Automatic)   Pulse: 76   Resp: 18   SpO2: 98%   Weight: 58.5 kg (129 lb)   Height: 5' 2" (1.575 m)       Physical Exam  Vitals and nursing note reviewed.   Constitutional:       General: She is not in acute distress.     Appearance: Normal appearance. She is not diaphoretic.   HENT:      Head: Normocephalic and atraumatic.      Mouth/Throat:      Mouth: Mucous membranes are moist.      Pharynx: Oropharynx is clear.   Eyes:      Conjunctiva/sclera: Conjunctivae normal.   Cardiovascular:      Rate and Rhythm: Normal rate and regular rhythm.      Pulses: Normal pulses.      Heart sounds: Normal heart sounds.   Pulmonary:      Effort: Pulmonary effort is normal.      Breath sounds: Normal breath sounds. No stridor. No wheezing or rales.   Abdominal:      Palpations: Abdomen is soft.      Tenderness: There is no abdominal tenderness. There is no guarding.   Musculoskeletal:         General: No tenderness. Normal range of motion.      Cervical back: Normal range of motion.      Right lower leg: No edema.      Left " lower leg: No edema.   Lymphadenopathy:      Cervical: No cervical adenopathy.   Skin:     General: Skin is warm and dry.      Capillary Refill: Capillary refill takes less than 2 seconds.   Neurological:      Mental Status: She is alert and oriented to person, place, and time.   Psychiatric:         Mood and Affect: Mood and affect normal.         Behavior: Behavior normal. Behavior is cooperative.         Orders Only on 05/22/2023   Component Date Value Ref Range Status    Fecal Globin, Insure 05/30/2023 Negative  Negative Final   Office Visit on 03/29/2023   Component Date Value Ref Range Status    BIENVENIDO 03/29/2023 POSITIVE (A)  NEGATIVE Final    BIENVENIDO Titer 03/29/2023 1:40 (H)  titer Final    BIENVENIDO Pattern 03/29/2023 Nuclear, Speckled (A)   Final    Rheumatoid Factor 03/29/2023 <14  <14 IU/mL Final         Assessment:      1. Other hyperlipidemia Well controlled   2. Seasonal allergies    3. Pure hypercholesterolemia    4. Pain in other joint    5. Swelling of finger joint, unspecified laterality    6. Positive BIENVENIDO (antinuclear antibody)    7. Gastroesophageal reflux disease with esophagitis without hemorrhage    8. Annual physical exam    9. Thyroid disorder screen    10. Urine frequency    11. Vitamin D deficiency    12. Diabetes mellitus screening          Plan:     Other hyperlipidemia  Comments:  continue STATIN daily, eat a low cholesterol diet and exercise regularly  Orders:  -     Lipid Panel; Future; Expected date: 10/01/2023    Seasonal allergies    Pure hypercholesterolemia  Comments:  Continue STATIN daily, eat low cholesterol diet and exercise regularly  Orders:  -     atorvastatin (LIPITOR) 20 MG tablet; Take 1 tablet (20 mg total) by mouth every evening.  Dispense: 90 tablet; Refill: 3    Pain in other joint  -     Ambulatory referral/consult to Rheumatology; Future; Expected date: 09/12/2023    Swelling of finger joint, unspecified laterality  -     Ambulatory referral/consult to Rheumatology; Future;  Expected date: 09/12/2023    Positive BIENVENIDO (antinuclear antibody)  -     Ambulatory referral/consult to Rheumatology; Future; Expected date: 09/12/2023    Gastroesophageal reflux disease with esophagitis without hemorrhage  -     pantoprazole (PROTONIX) 20 MG tablet; Take 1 tablet (20 mg total) by mouth once daily.  Dispense: 30 tablet; Refill: 0    Annual physical exam  -     CBC Auto Differential; Future; Expected date: 10/01/2023  -     Comprehensive Metabolic Panel; Future; Expected date: 10/01/2023  -     Hemoglobin A1C; Future; Expected date: 10/01/2023  -     Lipid Panel; Future; Expected date: 10/01/2023  -     TSH; Future; Expected date: 10/01/2023    Thyroid disorder screen  -     TSH; Future; Expected date: 10/01/2023    Urine frequency  -     Urinalysis, Reflex to Urine Culture Urine, Clean Catch; Future; Expected date: 10/01/2023    Vitamin D deficiency  -     Vitamin D; Future; Expected date: 10/01/2023    Diabetes mellitus screening  -     Hemoglobin A1C; Future; Expected date: 10/01/2023         Current Outpatient Medications   Medication Sig Dispense Refill    atorvastatin (LIPITOR) 20 MG tablet Take 1 tablet (20 mg total) by mouth every evening. 90 tablet 3    pantoprazole (PROTONIX) 20 MG tablet Take 1 tablet (20 mg total) by mouth once daily. 30 tablet 0     No current facility-administered medications for this visit.       Medications Discontinued During This Encounter   Medication Reason    atorvastatin (LIPITOR) 20 MG tablet Reorder       Health Maintenance   Topic Date Due    Hepatitis C Screening  Never done    TETANUS VACCINE  Never done    Shingles Vaccine (1 of 2) Never done    Mammogram  06/14/2023    Colorectal Cancer Screening  05/30/2024    Lipid Panel  03/24/2028       Patient Instructions   RTC in 6 months for F/U or sooner if needed.    Keep appts with specialists as scheduled.    Patient Education       Reflujo y enfermedad por reflujo gastroesofágico en adultos   Conceptos Básicos  "  Redactado por los médicos y editores de UpToDate   ¿Qué es el reflujo? -- El reflujo se produce cuando el ácido que normalmente está en el estómago retrocede hasta el esófago. El esófago es el tubo que transporta los alimentos desde la boca hasta el estómago (figura 1).  Cuando el reflujo ocasiona síntomas molestos o daño, los médicos lo llaman "enfermedad por reflujo gastroesofágico" o "ERGE".  ¿Cuáles son los síntomas del reflujo? -- Los síntomas más comunes son:  Acidez estomacal, saurav sensación de ardor en el pecho  Regurgitación, que ocurre cuando el ácido y los alimentos no digeridos retroceden hacia la garganta o la boca   Otros síntomas podrían ser:  Dolor en el estómago o en el pecho  Dificultad para tragar  Voz áspera o dolor de garganta  Tos sin causa aparente  Náuseas o vómitos  ¿Hay algo que pueda hacer por mi cuenta para sentirme mejor? -- Sí. Quizá se sienta mejor si:  Baja de peso (si tiene sobrepeso)  [place] el cabezal de la cama entre 6 y 8 pulgadas - Puede hacer esto colocando bloques de jimenez debajo de dos patas de la cama o con un taco de espuma de poliestireno debajo del colchón.  Ness los alimentos que empeoran niko síntomas - Para algunas personas, estos son el café, el chocolate, el alcohol, la menta y los alimentos grasosos, entre otros.  Destiny de fumar, si fuma  Ness comer tarde - Recostarse con el estómago lleno puede empeorar el reflujo. Intente planificar niko comidas para al menos 2 o 3 horas antes de la hora de acostarse.  Ness la ropa ajustada - Algunas personas se sienten mejor si usan ropa cómoda que no les presiona el área del estómago.   ¿Cómo se trata el reflujo? -- Existen varios tipos principales de medicinas que pueden ayudar con los síntomas del reflujo. Los más comunes son los antiácidos, los bloqueadores de histamina y los inhibidores de la bomba de protones (tabla 1). Todas estas medicinas reducen o bloquean el ácido del estómago, fadia cada saurav de ellas lo hace de " distinta manera.  Los antiácidos pueden ayudar con los síntomas leves, fadia solo lo hacen por poco tiempo. Los bloqueadores de histamina son más roland que los antiácidos y ortega efecto es más duradero. Los antiácidos y la mayoría de los bloqueadores de histamina pueden comprarse sin receta.  Los inhibidores de la bomba de protones (IBP) son las medicinas más eficaces para los síntomas frecuentes y más intensos. Algunas de estas medicinas se venden sin receta, fadia ortega médico puede recetarle otras versiones.  A veces, las medicinas cuestan menos si las compra con receta médica. Otras veces, las medicinas de venta sin receta cuestan menos. Si le preocupa el costo, pregunte a ortega farmacéutico sobre modos de abaratar carley medicinas.  ¿Randi consultar a un médico o enfermero sobre el reflujo? -- Algunas personas pueden controlar el reflujo por ortega cuenta simplemente al cambiar carley hábitos o nathalie medicinas de venta sin receta, fadia debe consultar al médico o enfermero si:  Carley síntomas son graves o mcgee mucho tiempo  Le parece que no puede controlar carley síntomas  Ha tenido síntomas myles muchos años  También debe consultar al médico o enfermero de inmediato si:  Tiene dificultad para tragar o siente que la comida se atora al bajar  Baja de peso en forma involuntaria  Siente dolor en el pecho  Se atraganta al comer  Vomita rakan o tiene evacuaciones levine, negras o que parecen vanessa  ¿Que sucede si mi hijo pequeño o adolescente tiene reflujo? -- Si ortega hijo pequeño o adolescente tiene reflujo, llévelo a que lo dueyn un médico o enfermero. No le dé a ortega hijo medicinas para tratar el reflujo sin consultar a un médico o enfermero.  En los niños, el reflujo puede tener múltiples causas, por eso es importante que un médico o enfermero verifique la causa antes de iniciar cualquier tratamiento.  Todos los artículos se actualizan a medida que se descubre nueva evidencia y culmina nuestro proceso de evaluación por homólogos   Rasheeda  artículo se recuperó de UpToDate el: Sep 21, 2021.  Artículo 28474 Versión [id number].es-419.1  Release: 29.4.2 - C29.263  © 2021 [facility] [employer] Todos los derechos reservados.  figura 1: Tracto digestivo superior     El tracto digestivo superior comprende el esófago (el tubo que va de la boca al estómago), el estómago y el duodeno (la primera parte del intestino sanchez).  Gráfico 04462 Versión 6.0    tabla 1: Medicinas para disminuir el ácido del estómago  Tipo de medicina  Ejemplos de nombres de la medicina    Antiácidos* Carbonato de calcio (ejemplos de marcas comerciales: Maalox, Tums)    Hidróxido de aluminio, hidróxido de magnesio y simeticona (ejemplo de anthnoy comercial: Mylanta)   Agentes de superficie Sucralfato (anthony comercial: Carafate)   Bloqueadores de histamina¶  Famotidina (anthony comercial: Pepcid)    Cimetidina (anthony comercial: Tagamet)   Inhibidores de la bomba de protones Omeprazol (anthony comercial: Prilosec)    Esomeprazol (anthony comercial: Nexium)    Pantoprazol (anthony comercial: Protonix)    Lansoprazol (anthony comercial: Prevacid)    Dexlansoprazol (anthony comercial: Dexilant)    Rabeprazol (anthony comercial: AcipHex)   Gráfico 47796 Versión 14.0  Exención de responsabilidad y uso de la información del consumidor   Esta información no es un consejo médico específico ni es un sustituto de la información que le oriana ortega proveedor de atención médica. Es solamente un resumen breve de datos generales. NO incluye toda la información sobre padecimientos, enfermedades, lesiones, pruebas, procedimientos, tratamientos, terapias, instrucciones de ravinder u opciones de estilo de carlos que podrían corresponder en ortega brian. Debe hablar con ortega proveedor de atención médica para obtener información completa sobre ortega gera y niko opciones de tratamiento. Esta información no debe utilizarse para decidir si aceptar o no el consejo, las instrucciones o las recomendaciones de ortega proveedor de atención médica, y solo él posee los  "conocimientos y la capacitación para darle consejos adecuados para usted.El uso de christoph sitio web se rige por los Términos de uso de UpToDate ©2021 [employer] Todos los [facility] reservados.  Copyright   © 2021 [employer] Todos los [facility] [place] [facility].    Patient Education       Low Cholesterol, Saturated Fat, and Trans Fat Diet   About this topic   Cholesterol, saturated fat, and trans fat are in many foods. These may raise your blood cholesterol levels. If your cholesterol is too high, this can cause health problems in your heart, liver, kidneys, and even your eyes. The key to lowering your risk of heart problems is to lower your bad fat intake.  Saturated fats and trans fats are the bad fats. These fats clog your arteries and raise your bad cholesterol. Saturated fats and trans fats are solid fats at room temperature. Saturated fats are animal fats. Trans fats are manmade fats. They add flavor to a lot of packaged foods. Staying away from saturated and trans fats will help your heart.  When you do eat foods with fat, make sure they have the good fats. Monounsaturated and polyunsaturated fats are good fats. These fats help raise your good cholesterol and protect your heart.  General   How to Lower Fat and Cholesterol in Your Diet   Read the labels of the foods you buy from the market to find out how much fat is present. Under 5% of total fat on a label means it is "low fat". Over 20% of total fat on a label means it is high fat.  Eat high fiber foods, like soluble fiber. This type of fiber helps lower cholesterol in the body. Choose oatmeal, fruits (like apples), beans, and nuts to get the most soluble fiber.  Eat foods high in omega-3 fatty acids like toshia seeds, walnuts, salmon, tuna, trout, herring, flaxseed, and soybeans. These foods help keep the heart healthy.  Limit your bad fat and oil intake.  Stay away from butter, stick margarine, shortening, lard, and palm and coconut oil. Pick plant-based " spreads instead.  Limit mayonnaise, salad dressings, gravies, and sauces, unless it is made from low-fat ingredients.  Limit chocolate.  Do not eat high-fat processed foods like hot dogs, newell, sausage, ham and other luncheon meats high in fat, and some frozen foods. Pick fish, chicken, turkey, and lean meats instead.  Eat more dried beans, lentils, and tofu to get your protein.  Do not eat organ meats, like liver.  Choose nonfat or low-fat milk, yogurt, and cheese.  Use light or fat-free cream cheese and sour cream.  Eat lots of fruits and vegetables.  Pick whole grain breads, cereals, pastas, and rice.  Do not eat snacks that are high in fats like granola, cookies, pies, pastries, doughnuts, and croissants.  Stay away from deep fried foods.  Help When Cooking   Remove the fat portion of meats and the skin from poultry before cooking.  Bake, broil, grill, poach, or roast poultry, fish, and lean meats.  Drain and throw away the fat that drains out of meat as you cook it.  Try to add little or no fat to foods.  Use olive or canola oil for cooking or baking.  Steam your vegetables.  Use herbs or no-oil marinades to flavor foods.         Who should use this diet?   This diet is for people who are at high risk of getting health problems like heart disease, high blood pressure, diabetes, and others. This diet is also good for all people to follow to keep your heart healthy.  What foods are good to eat?   Foods with good fats are:  Canola, peanut, and olive oil  Safflower, soybean, and corn oil  Walnuts, almonds, cashews, and peanuts  Pumpkin and sunflower seeds  [place] and tuna  Tofu  Soymilk  Avocado  What foods should be limited or avoided?   Stay away from these types of foods that have saturated fats:  Whole fat dairy products like cheese, ice cream, whole milk, and cream  Palm and coconut oils  High-fat meats like beef, lamb, poultry with the skin, newell, and sausage  Butter and lard  Stay away from these types of  foods that may have trans fat:  Cookies, cakes, candy, doughnuts, baked goods, muffins, pizza dough, and pie crusts that are packaged  Fried foods  Frozen dinners  Chips and crackers  Microwave popcorn  Stick margarine and vegetable shortenings  Helpful tips   To help stay away from saturated fat:  Pick lean cuts of meat  Take the skin off chicken and turkey or pick skinless  Pick low-fat cheese, milk, and ice cream  Use liquid oils when cooking and baking, such as olive oil and canola oil  To help stay away from trans fat:  Look at your labels. Choose foods with 0% trans fat. Read the ingredient list. Avoid foods with partially hydrogenated oil in the ingredient list. This means there is trans fat in the product.  Where can I learn more?   American Heart Association   http://www.heart.org/HEARTORG/Conditions/Cholesterol/PreventionTreatmentofHighCholesterol/Know-Your-Fats_UC_305628_Article.jsp   Last Reviewed Date   2021-10-05  Consumer Information Use and Disclaimer   This information is not specific medical advice and does not replace information you receive from your health care provider. This is only a brief summary of general information. It does NOT include all information about conditions, illnesses, injuries, tests, procedures, treatments, therapies, discharge instructions or life-style choices that may apply to you. You must talk with your health care provider for complete information about your health and treatment options. This information should not be used to decide whether or not to accept your health care providers advice, instructions or recommendations. Only your health care provider has the knowledge and training to provide advice that is right for you.   Copyright   Copyright © 2021 UpToDate, Inc. and its affiliates and/or licensors. All rights reserved.      Risks, benefits, and alternatives discussed with patient, Patient verbalized understanding of discussed plan of care. Asked patient if any  further questions, answered no.    Future Appointments   Date Time Provider Department Center   9/14/2023  1:00 PM Rosalva Delvalle MD [facility] [id number] [facility]legos   3/5/2024  9:20 AM Bertha Stein NP [facility] [id number] [facility] Stein NP

## 2023-09-05 NOTE — PATIENT INSTRUCTIONS
"RTC in 6 months for F/U or sooner if needed.    Keep appts with specialists as scheduled.    Patient Education       Reflujo y enfermedad por reflujo gastroesofágico en adultos   Conceptos Básicos   Redactado por los médicos y editores de [employer]   ¿Qué es el reflujo? -- El reflujo se produce cuando el ácido que normalmente está en el estómago retrocede hasta el esófago. El esófago es el tubo que transporta los alimentos desde la boca hasta el estómago (figura 1).  Cuando el reflujo ocasiona síntomas molestos o daño, los médicos lo llaman "enfermedad por reflujo gastroesofágico" o "ERGE".  ¿Cuáles son los síntomas del reflujo? -- Los síntomas más comunes son:  Acidez estomacal, saurav sensación de ardor en el pecho  Regurgitación, que ocurre cuando el ácido y los alimentos no digeridos retroceden hacia la garganta o la boca   Otros síntomas podrían ser:  Dolor en el estómago o en el pecho  Dificultad para tragar  Voz áspera o dolor de garganta  Tos sin causa aparente  Náuseas o vómitos  ¿Hay algo que pueda hacer por mi cuenta para sentirme mejor? -- Sí. Quizá se sienta mejor si:  Baja de peso (si tiene sobrepeso)  [place] el cabezal de la cama entre 6 y 8 pulgadas - Puede hacer esto colocando bloques de jimenez debajo de dos patas de la cama o con un taco de espuma de poliestireno debajo del colchón.  Ness los alimentos que empeoran niko síntomas - Para algunas personas, estos son el café, el chocolate, el alcohol, la menta y los alimentos grasosos, entre otros.  Destiny de fumar, si fuma  Ness comer tarde - Recostarse con el estómago lleno puede empeorar el reflujo. Intente planificar niko comidas para al menos 2 o 3 horas antes de la hora de acostarse.  Ness la ropa ajustada - Algunas personas se sienten mejor si usan ropa cómoda que no les presiona el área del estómago.   ¿Cómo se trata el reflujo? -- Existen varios tipos principales de medicinas que pueden ayudar con los síntomas del reflujo. Los más comunes son los " antiácidos, los bloqueadores de histamina y los inhibidores de la bomba de protones (tabla 1). Todas estas medicinas reducen o bloquean el ácido del estómago, fadia cada saurav de ellas lo hace de distinta manera.  Los antiácidos pueden ayudar con los síntomas leves, fadia solo lo hacen por poco tiempo. Los bloqueadores de histamina son más roland que los antiácidos y ortega efecto es más duradero. Los antiácidos y la mayoría de los bloqueadores de histamina pueden comprarse sin receta.  Los inhibidores de la bomba de protones (IBP) son las medicinas más eficaces para los síntomas frecuentes y más intensos. Algunas de estas medicinas se venden sin receta, fadia ortega médico puede recetarle otras versiones.  A veces, las medicinas cuestan menos si las compra con receta médica. Otras veces, las medicinas de venta sin receta cuestan menos. Si le preocupa el costo, pregunte a otrega farmacéutico sobre modos de abaratar carley medicinas.  ¿Randi consultar a un médico o enfermero sobre el reflujo? -- Algunas personas pueden controlar el reflujo por ortega cuenta simplemente al cambiar carley hábitos o nathalie medicinas de venta sin receta, fadia debe consultar al médico o enfermero si:  Carley síntomas son graves o mcgee mucho tiempo  Le parece que no puede controlar carley síntomas  Ha tenido síntomas myles muchos años  También debe consultar al médico o enfermero de inmediato si:  Tiene dificultad para tragar o siente que la comida se atora al bajar  Baja de peso en forma involuntaria  Siente dolor en el pecho  Se atraganta al comer  Vomita rakan o tiene evacuaciones levine, negras o que parecen vanessa  ¿Que sucede si mi hijo pequeño o adolescente tiene reflujo? -- Si ortega hijo pequeño o adolescente tiene reflujo, llévelo a que lo duyen un médico o enfermero. No le dé a ortega hijo medicinas para tratar el reflujo sin consultar a un médico o enfermero.  En los niños, el reflujo puede tener múltiples causas, por eso es importante que un médico o enfermero  verifique la causa antes de iniciar cualquier tratamiento.  Todos los artículos se actualizan a medida que se descubre nueva evidencia y culmina nuestro proceso de evaluación por homólogos   Rasheeda artículo se recuperó de UpToDate el: Sep 21, 2021.  Artículo 20769 Versión 11.0.es-419.1  Release: 29.4.2 - C29.263  © 2021 UpToDate, Inc. Todos los derechos reservados.  figura 1: Tracto digestivo superior     El tracto digestivo superior comprende el esófago (el tubo que va de la boca al estómago), el estómago y el duodeno (la primera parte del intestino sanchez).  Gráfico 41203 Versión 6.0    tabla 1: Medicinas para disminuir el ácido del estómago  Tipo de medicina  Ejemplos de nombres de la medicina    Antiácidos* Carbonato de calcio (ejemplos de marcas comerciales: Maalox, Tums)    Hidróxido de aluminio, hidróxido de magnesio y simeticona (ejemplo de anthony comercial: Mylanta)   Agentes de superficie Sucralfato (anthony comercial: Carafate)   Bloqueadores de histamina¶  Famotidina (anthony comercial: Pepcid)    Cimetidina (anthony comercial: Tagamet)   Inhibidores de la bomba de protones Omeprazol (anthony comercial: Prilosec)    Esomeprazol (anthony comercial: Nexium)    Pantoprazol (anthony comercial: Protonix)    Lansoprazol (anthony comercial: Prevacid)    Dexlansoprazol (anthony comercial: Dexilant)    Rabeprazol (anthony comercial: AcipHex)   Gráfico 38325 Versión 14.0  Exención de responsabilidad y uso de la información del consumidor   Esta información no es un consejo médico específico ni es un sustituto de la información que le oriana ortega proveedor de atención médica. Es solamente un resumen breve de datos generales. NO incluye toda la información sobre padecimientos, enfermedades, lesiones, pruebas, procedimientos, tratamientos, terapias, instrucciones de ravinder u opciones de estilo de carlos que podrían corresponder en ortega brian. Debe hablar con ortega proveedor de atención médica para obtener información completa sobre ortega gera y niko  "opciones de tratamiento. Esta información no debe utilizarse para decidir si aceptar o no el consejo, las instrucciones o las recomendaciones de ortega proveedor de atención médica, y solo él posee los conocimientos y la capacitación para darle consejos adecuados para usted.El uso de christoph sitio web se rige por los Términos de uso de UpToDate ©2021 [employer] Todos los derechos reservados.  Copyright   © 2021 [employer]. Todos los derechos reservados.    Patient Education       Low Cholesterol, Saturated Fat, and Trans Fat Diet   About this topic   Cholesterol, saturated fat, and trans fat are in many foods. These may raise your blood cholesterol levels. If your cholesterol is too high, this can cause health problems in your heart, liver, kidneys, and even your eyes. The key to lowering your risk of heart problems is to lower your bad fat intake.  Saturated fats and trans fats are the bad fats. These fats clog your arteries and raise your bad cholesterol. Saturated fats and trans fats are solid fats at room temperature. Saturated fats are animal fats. Trans fats are manmade fats. They add flavor to a lot of packaged foods. Staying away from saturated and trans fats will help your heart.  When you do eat foods with fat, make sure they have the good fats. Monounsaturated and polyunsaturated fats are good fats. These fats help raise your good cholesterol and protect your heart.  General   How to Lower Fat and Cholesterol in Your Diet   Read the labels of the foods you buy from the market to find out how much fat is present. Under 5% of total fat on a label means it is "low fat". Over 20% of total fat on a label means it is high fat.  Eat high fiber foods, like soluble fiber. This type of fiber helps lower cholesterol in the body. Choose oatmeal, fruits (like apples), beans, and nuts to get the most soluble fiber.  Eat foods high in omega-3 fatty acids like toshia seeds, walnuts, salmon, tuna, trout, herring, " flaxseed, and soybeans. These foods help keep the heart healthy.  Limit your bad fat and oil intake.  Stay away from butter, stick margarine, shortening, lard, and palm and coconut oil. Pick plant-based spreads instead.  Limit mayonnaise, salad dressings, gravies, and sauces, unless it is made from low-fat ingredients.  Limit chocolate.  Do not eat high-fat processed foods like hot dogs, newell, sausage, ham and other luncheon meats high in fat, and some frozen foods. Pick fish, chicken, turkey, and lean meats instead.  Eat more dried beans, lentils, and tofu to get your protein.  Do not eat organ meats, like liver.  Choose nonfat or low-fat milk, yogurt, and cheese.  Use light or fat-free cream cheese and sour cream.  Eat lots of fruits and vegetables.  Pick whole grain breads, cereals, pastas, and rice.  Do not eat snacks that are high in fats like granola, cookies, pies, pastries, doughnuts, and croissants.  Stay away from deep fried foods.  Help When Cooking   Remove the fat portion of meats and the skin from poultry before cooking.  Bake, broil, grill, poach, or roast poultry, fish, and lean meats.  Drain and throw away the fat that drains out of meat as you cook it.  Try to add little or no fat to foods.  Use olive or canola oil for cooking or baking.  Steam your vegetables.  Use herbs or no-oil marinades to flavor foods.         Who should use this diet?   This diet is for people who are at high risk of getting health problems like heart disease, high blood pressure, diabetes, and others. This diet is also good for all people to follow to keep your heart healthy.  What foods are good to eat?   Foods with good fats are:  Canola, peanut, and olive oil  Safflower, soybean, and corn oil  Walnuts, almonds, cashews, and peanuts  Pumpkin and sunflower seeds  [place] and tuna  Tofu  Soymilk  Avocado  What foods should be limited or avoided?   Stay away from these types of foods that have saturated fats:  Whole fat  dairy products like cheese, ice cream, whole milk, and cream  Palm and coconut oils  High-fat meats like beef, lamb, poultry with the skin, newell, and sausage  Butter and lard  Stay away from these types of foods that may have trans fat:  Cookies, cakes, candy, doughnuts, baked goods, muffins, pizza dough, and pie crusts that are packaged  Fried foods  Frozen dinners  Chips and crackers  Microwave popcorn  Stick margarine and vegetable shortenings  Helpful tips   To help stay away from saturated fat:  Pick lean cuts of meat  Take the skin off chicken and turkey or pick skinless  Pick low-fat cheese, milk, and ice cream  Use liquid oils when cooking and baking, such as olive oil and canola oil  To help stay away from trans fat:  Look at your labels. Choose foods with 0% trans fat. Read the ingredient list. Avoid foods with partially hydrogenated oil in the ingredient list. This means there is trans fat in the product.  Where can I learn more?   American Heart Association   http://www.heart.org/HEARTORG/Conditions/Cholesterol/PreventionTreatmentofHighCholesterol/Know-Your-Fats_[facility]_305628_Article.jsp   Last Reviewed Date   2021-10-05  Consumer Information Use and Disclaimer   This information is not specific medical advice and does not replace information you receive from your health care provider. This is only a brief summary of general information. It does NOT include all information about conditions, illnesses, injuries, tests, procedures, treatments, therapies, discharge instructions or life-style choices that may apply to you. You must talk with your health care provider for complete information about your health and treatment options. This information should not be used to decide whether or not to accept your health care providers advice, instructions or recommendations. Only your health care provider has the knowledge and training to provide advice that is right for you.   Copyright   Copyright © 2021 UpToDate,  Inc. and its affiliates and/or licensors. All rights reserved.

## 2023-09-14 ENCOUNTER — OFFICE VISIT (OUTPATIENT)
Dept: OBSTETRICS AND GYNECOLOGY | Facility: CLINIC | Age: 58
End: 2023-09-14
Payer: COMMERCIAL

## 2023-09-14 VITALS
WEIGHT: 129 LBS | SYSTOLIC BLOOD PRESSURE: 101 MMHG | DIASTOLIC BLOOD PRESSURE: 67 MMHG | HEART RATE: 75 BPM | BODY MASS INDEX: 23.59 KG/M2

## 2023-09-14 DIAGNOSIS — Z01.419 WELL WOMAN EXAM WITH ROUTINE GYNECOLOGICAL EXAM: Primary | ICD-10-CM

## 2023-09-14 LAB — BCS RECOMMENDATION EXT: NORMAL

## 2023-09-14 PROCEDURE — 3008F PR BODY MASS INDEX (BMI) DOCUMENTED: ICD-10-PCS | Mod: CPTII,S$GLB,, | Performed by: OBSTETRICS & GYNECOLOGY

## 2023-09-14 PROCEDURE — 3008F BODY MASS INDEX DOCD: CPT | Mod: CPTII,S$GLB,, | Performed by: OBSTETRICS & GYNECOLOGY

## 2023-09-14 PROCEDURE — 99396 PR PREVENTIVE VISIT,EST,40-64: ICD-10-PCS | Mod: S$GLB,,, | Performed by: OBSTETRICS & GYNECOLOGY

## 2023-09-14 PROCEDURE — 3078F DIAST BP <80 MM HG: CPT | Mod: CPTII,S$GLB,, | Performed by: OBSTETRICS & GYNECOLOGY

## 2023-09-14 PROCEDURE — 3078F PR MOST RECENT DIASTOLIC BLOOD PRESSURE < 80 MM HG: ICD-10-PCS | Mod: CPTII,S$GLB,, | Performed by: OBSTETRICS & GYNECOLOGY

## 2023-09-14 PROCEDURE — 1159F PR MEDICATION LIST DOCUMENTED IN MEDICAL RECORD: ICD-10-PCS | Mod: CPTII,S$GLB,, | Performed by: OBSTETRICS & GYNECOLOGY

## 2023-09-14 PROCEDURE — 1159F MED LIST DOCD IN RCRD: CPT | Mod: CPTII,S$GLB,, | Performed by: OBSTETRICS & GYNECOLOGY

## 2023-09-14 PROCEDURE — 3074F SYST BP LT 130 MM HG: CPT | Mod: CPTII,S$GLB,, | Performed by: OBSTETRICS & GYNECOLOGY

## 2023-09-14 PROCEDURE — 3044F HG A1C LEVEL LT 7.0%: CPT | Mod: CPTII,S$GLB,, | Performed by: OBSTETRICS & GYNECOLOGY

## 2023-09-14 PROCEDURE — 99396 PREV VISIT EST AGE 40-64: CPT | Mod: S$GLB,,, | Performed by: OBSTETRICS & GYNECOLOGY

## 2023-09-14 PROCEDURE — 3044F PR MOST RECENT HEMOGLOBIN A1C LEVEL <7.0%: ICD-10-PCS | Mod: CPTII,S$GLB,, | Performed by: OBSTETRICS & GYNECOLOGY

## 2023-09-14 PROCEDURE — 3074F PR MOST RECENT SYSTOLIC BLOOD PRESSURE < 130 MM HG: ICD-10-PCS | Mod: CPTII,S$GLB,, | Performed by: OBSTETRICS & GYNECOLOGY

## 2023-09-14 NOTE — PROGRESS NOTES
Subjective:       Patient ID: Shima Wood is a 58 y.o. female.    Chief Complaint:  Well Woman      History of Present Illness  Pt here for gyn annual.  History and past labs reviewed with patient.    Complaints of some vaginal dryness.       Review of Systems  Review of Systems   Constitutional:  Negative for chills and fever.   Respiratory:  Negative for shortness of breath.    Cardiovascular:  Negative for chest pain.   Gastrointestinal:  Negative for abdominal pain, blood in stool, constipation, diarrhea, nausea, vomiting and reflux.   Genitourinary:  Positive for vaginal dryness. Negative for dysmenorrhea, dyspareunia, dysuria, hematuria, hot flashes, menorrhagia, menstrual problem, pelvic pain, vaginal bleeding, vaginal discharge and postcoital bleeding.   Musculoskeletal:  Negative for arthralgias and joint swelling.   Integumentary:  Negative for rash, hair changes, breast mass, nipple discharge and breast skin changes.   Psychiatric/Behavioral:  Negative for depression. The patient is not nervous/anxious.    Breast: Negative for asymmetry, lump, mass, nipple discharge and skin changes          Objective:     Vitals:    09/14/23 1255   BP: 101/67   Pulse: 75   Weight: 58.5 kg (129 lb)       Physical Exam:   Constitutional: She appears well-developed and well-nourished. No distress.    HENT:   Head: Normocephalic and atraumatic.    Eyes: Conjunctivae and EOM are normal.    Neck: No tracheal deviation present. No thyromegaly present.    Cardiovascular:       Exam reveals no clubbing, no cyanosis and no edema.        Pulmonary/Chest: Effort normal. No respiratory distress.        Abdominal: Soft. She exhibits no distension and no mass. There is no abdominal tenderness. There is no rebound and no guarding. No hernia.     Genitourinary:    Uterus and rectum normal.      Pelvic exam was performed with patient supine.   There is no rash, tenderness, lesion or injury on the right labia. There is no rash,  tenderness, lesion or injury on the left labia. Cervix is normal. Right adnexum displays no mass, no tenderness and no fullness. Left adnexum displays no mass, no tenderness and no fullness. There is erythema in the vagina. Vaginal atrophy noted.                Skin: She is not diaphoretic. No cyanosis. Nails show no clubbing.        breast exam deferred - MMG done today     Assessment:        1. Well woman exam with routine gynecological exam             Plan:         Annual   Pap UTD   MMG today   Colonoscopy FOBT card neg   Vaginal moisturizer recommended   Risk assessment for inherited gyn cancer done   RTC  1 year     Patient was counseled today on current ASCCP pap guidelines, the recommendation for yearly pelvic exams, healthy diet and exercise routines, annual mammograms starting at age 40, and breast self awareness. She is to see her PCP for other health maintenance

## 2023-09-18 DIAGNOSIS — R92.8 ABNORMAL MAMMOGRAM OF LEFT BREAST: Primary | ICD-10-CM

## 2023-09-19 ENCOUNTER — TELEPHONE (OUTPATIENT)
Dept: PRIMARY CARE CLINIC | Facility: CLINIC | Age: 58
End: 2023-09-19
Payer: COMMERCIAL

## 2023-09-19 NOTE — TELEPHONE ENCOUNTER
----- Message from Bertha Stein NP sent at 9/18/2023  6:23 PM CDT -----  Please call and notify patient that there is an area of concern in her left breast so it is recommended a diagnostic mammogram be done of her left breast to further evaluate. Let Ms. Wood know I have ordered the diagnostic mammogram and that she will receive a call to schedule it. Thanks.

## 2023-09-20 ENCOUNTER — PATIENT OUTREACH (OUTPATIENT)
Dept: ADMINISTRATIVE | Facility: HOSPITAL | Age: 58
End: 2023-09-20
Payer: COMMERCIAL

## 2023-09-20 NOTE — PROGRESS NOTES
Manually uploaded mammogram screening to media. Patient needs DX Mammo completed. Remind me set to check for record

## 2023-10-03 LAB — RECOMMENDATION:: NORMAL

## 2023-10-04 ENCOUNTER — PATIENT MESSAGE (OUTPATIENT)
Dept: PRIMARY CARE CLINIC | Facility: CLINIC | Age: 58
End: 2023-10-04
Payer: COMMERCIAL

## 2023-10-04 ENCOUNTER — PATIENT MESSAGE (OUTPATIENT)
Dept: OBSTETRICS AND GYNECOLOGY | Facility: CLINIC | Age: 58
End: 2023-10-04
Payer: COMMERCIAL

## 2023-10-06 ENCOUNTER — TELEPHONE (OUTPATIENT)
Dept: PRIMARY CARE CLINIC | Facility: CLINIC | Age: 58
End: 2023-10-06
Payer: COMMERCIAL

## 2023-10-06 NOTE — TELEPHONE ENCOUNTER
----- Message from Bertha Stein NP sent at 10/6/2023  8:12 AM CDT -----  Please notify patient the mass in her left breast is a benign cyst which requires no further treatment. Will repeat routine mammogram in 1 year.

## 2024-02-28 ENCOUNTER — TELEPHONE (OUTPATIENT)
Dept: PRIMARY CARE CLINIC | Facility: CLINIC | Age: 59
End: 2024-02-28
Payer: COMMERCIAL

## 2024-02-28 ENCOUNTER — CLINICAL SUPPORT (OUTPATIENT)
Dept: OBSTETRICS AND GYNECOLOGY | Facility: CLINIC | Age: 59
End: 2024-02-28
Payer: COMMERCIAL

## 2024-02-28 DIAGNOSIS — Z01.89 ROUTINE LAB DRAW: Primary | ICD-10-CM

## 2024-02-28 LAB
25(OH)D3 SERPL-MCNC: 30 NG/ML
ABS NRBC COUNT: 0 THOU/UL (ref 0–0.01)
ABSOLUTE BASOPHIL: 0 10*3/UL (ref 0–0.3)
ABSOLUTE EOSINOPHIL: 0.1 10*3/UL (ref 0–0.6)
ABSOLUTE IMMATURE GRAN: 0.03 THOU/UL (ref 0–0.03)
ABSOLUTE LYMPHOCYTE: 1.9 10*3/UL (ref 1.2–4)
ABSOLUTE MONOCYTE: 0.3 10*3/UL (ref 0.1–0.8)
ALBUMIN SERPL BCP-MCNC: 4 G/DL (ref 3.4–5)
ALP SERPL-CCNC: 85 U/L (ref 45–117)
ALT SERPL W P-5'-P-CCNC: 46 U/L (ref 13–56)
APPEARANCE, UA: CLEAR
AST SERPL-CCNC: 26 U/L (ref 15–37)
BACTERIA SPEC CULT: ABNORMAL /HPF
BASOPHILS NFR BLD: 0.8 % (ref 0–3)
BILIRUB SERPL-MCNC: 0.6 MG/DL (ref 0.2–1)
BILIRUB UR QL STRIP: NEGATIVE
BUN SERPL-MCNC: 15 MG/DL (ref 7–18)
BUN/CREAT SERPL: 22.72 RATIO
CALCIUM SERPL-MCNC: 9.1 MG/DL (ref 8.5–10.1)
CHLORIDE SERPL-SCNC: 104 MMOL/L (ref 98–107)
CHOLEST SERPL-MSCNC: 179 MG/DL
CO2 SERPL-SCNC: 32 MMOL/L (ref 21–32)
COLOR UR: ABNORMAL
CREAT SERPL-MCNC: 0.66 MG/DL (ref 0.55–1.02)
EOSINOPHIL NFR BLD: 2.4 % (ref 0–6)
ERYTHROCYTE [DISTWIDTH] IN BLOOD BY AUTOMATED COUNT: 12.5 % (ref 0–15.5)
ESTIMATED AVG GLUCOSE: 119 MG/DL
GFR ESTIMATION: > 60
GLUCOSE (UA): NEGATIVE MG/DL
GLUCOSE SERPL-MCNC: 92 MG/DL (ref 74–106)
HBA1C MFR BLD: 5.5 % (ref 4.2–6.3)
HCT VFR BLD AUTO: 41.3 % (ref 37–47)
HDLC SERPL-MCNC: 67 MG/DL
HGB BLD-MCNC: 13.6 G/DL (ref 12–16)
HGB UR QL STRIP: NEGATIVE
IMMATURE GRANULOCYTES: 0.6 % (ref 0–0.43)
KETONES UR QL STRIP: NEGATIVE MG/DL
LDLC SERPL CALC-MCNC: 99.8 MG/DL
LEUKOCYTE ESTERASE UR QL STRIP: 25 LEU/UL
LYMPHOCYTES NFR BLD: 37.8 % (ref 20–45)
MCH RBC QN AUTO: 31.3 PG (ref 27–32)
MCHC RBC AUTO-ENTMCNC: 32.9 % (ref 32–36)
MCV RBC AUTO: 95.2 FL (ref 80–99)
MONOCYTES NFR BLD: 6.8 % (ref 2–10)
MUCUS URINE: ABNORMAL /LPF
NEUTROPHILS # BLD AUTO: 2.6 10*3/UL (ref 1.4–7)
NEUTROPHILS NFR BLD: 51.6 % (ref 50–80)
NITRITE UR QL STRIP: NEGATIVE
NUCLEATED RED BLOOD CELLS: 0 % (ref 0–0.2)
PH UR STRIP: 6 PH (ref 5–8)
PLATELETS: 359 10*3/UL (ref 130–400)
PMV BLD AUTO: 9.3 FL (ref 9.2–12.2)
POTASSIUM SERPL-SCNC: 4.4 MMOL/L (ref 3.5–5.1)
PROT SERPL-MCNC: 7.6 G/DL (ref 6.4–8.2)
PROT UR QL STRIP: NEGATIVE MG/DL
RBC # BLD AUTO: 4.34 10*6/UL (ref 4.2–5.4)
RBC #/AREA URNS HPF: ABNORMAL /HPF (ref 0–2)
SERVICE COMMENT 03: ABNORMAL
SODIUM BLD-SCNC: 135 MMOL/L (ref 131–143)
SP GR UR STRIP: 1.02 (ref 1–1.03)
SQUAMOUS EPITHELIAL, UA: ABNORMAL /LPF
TRIGL SERPL-MCNC: 61 MG/DL (ref 0–149)
TSH SERPL DL<=0.005 MIU/L-ACNC: 1.21 UIU/ML (ref 0.36–3.74)
UROBILINOGEN UR STRIP-ACNC: NORMAL MG/DL
VLDL CHOLESTEROL: 12 MG/DL
WBC # BLD: 5 10*3/UL (ref 4.5–10)
WBC #/AREA URNS HPF: ABNORMAL /HPF (ref 0–2)

## 2024-02-28 PROCEDURE — 36415 COLL VENOUS BLD VENIPUNCTURE: CPT | Mod: S$GLB,,, | Performed by: NURSE PRACTITIONER

## 2024-02-28 NOTE — TELEPHONE ENCOUNTER
----- Message from Minal Fuentes sent at 2/28/2024  8:01 AM CST -----  Contact: self  Patient Shima Wood is requesting a call back regarding her appt on 3/5 - does she have to have any blood work or other tests done prior? Please call back at [phone]

## 2024-03-05 ENCOUNTER — OFFICE VISIT (OUTPATIENT)
Dept: PRIMARY CARE CLINIC | Facility: CLINIC | Age: 59
End: 2024-03-05
Payer: COMMERCIAL

## 2024-03-05 VITALS
HEIGHT: 62 IN | WEIGHT: 130 LBS | OXYGEN SATURATION: 98 % | HEART RATE: 76 BPM | TEMPERATURE: 98 F | DIASTOLIC BLOOD PRESSURE: 74 MMHG | BODY MASS INDEX: 23.92 KG/M2 | SYSTOLIC BLOOD PRESSURE: 110 MMHG | RESPIRATION RATE: 18 BRPM

## 2024-03-05 DIAGNOSIS — R21 SKIN RASH: ICD-10-CM

## 2024-03-05 DIAGNOSIS — J01.10 ACUTE NON-RECURRENT FRONTAL SINUSITIS: ICD-10-CM

## 2024-03-05 DIAGNOSIS — R76.8 POSITIVE ANA (ANTINUCLEAR ANTIBODY): ICD-10-CM

## 2024-03-05 DIAGNOSIS — J30.2 SEASONAL ALLERGIES: ICD-10-CM

## 2024-03-05 DIAGNOSIS — E78.49 OTHER HYPERLIPIDEMIA: ICD-10-CM

## 2024-03-05 DIAGNOSIS — K21.00 GASTROESOPHAGEAL REFLUX DISEASE WITH ESOPHAGITIS WITHOUT HEMORRHAGE: ICD-10-CM

## 2024-03-05 DIAGNOSIS — R05.1 ACUTE COUGH: ICD-10-CM

## 2024-03-05 DIAGNOSIS — Z00.00 ANNUAL PHYSICAL EXAM: Primary | ICD-10-CM

## 2024-03-05 PROCEDURE — 3008F BODY MASS INDEX DOCD: CPT | Mod: CPTII,S$GLB,, | Performed by: NURSE PRACTITIONER

## 2024-03-05 PROCEDURE — 1160F RVW MEDS BY RX/DR IN RCRD: CPT | Mod: CPTII,S$GLB,, | Performed by: NURSE PRACTITIONER

## 2024-03-05 PROCEDURE — 1159F MED LIST DOCD IN RCRD: CPT | Mod: CPTII,S$GLB,, | Performed by: NURSE PRACTITIONER

## 2024-03-05 PROCEDURE — 99396 PREV VISIT EST AGE 40-64: CPT | Mod: S$GLB,,, | Performed by: NURSE PRACTITIONER

## 2024-03-05 PROCEDURE — 3044F HG A1C LEVEL LT 7.0%: CPT | Mod: CPTII,S$GLB,, | Performed by: NURSE PRACTITIONER

## 2024-03-05 PROCEDURE — 3078F DIAST BP <80 MM HG: CPT | Mod: CPTII,S$GLB,, | Performed by: NURSE PRACTITIONER

## 2024-03-05 PROCEDURE — 3074F SYST BP LT 130 MM HG: CPT | Mod: CPTII,S$GLB,, | Performed by: NURSE PRACTITIONER

## 2024-03-05 RX ORDER — DESLORATADINE 5 MG/1
5 TABLET ORAL DAILY
Qty: 30 TABLET | Refills: 5 | Status: SHIPPED | OUTPATIENT
Start: 2024-03-05

## 2024-03-05 RX ORDER — TRIAMCINOLONE ACETONIDE 1 MG/G
CREAM TOPICAL 2 TIMES DAILY
Qty: 45 G | Refills: 1 | Status: SHIPPED | OUTPATIENT
Start: 2024-03-05

## 2024-03-05 RX ORDER — AMOXICILLIN 500 MG/1
500 TABLET, FILM COATED ORAL EVERY 12 HOURS
Qty: 14 TABLET | Refills: 0 | Status: SHIPPED | OUTPATIENT
Start: 2024-03-05 | End: 2024-03-12

## 2024-03-05 RX ORDER — PROMETHAZINE HYDROCHLORIDE AND DEXTROMETHORPHAN HYDROBROMIDE 6.25; 15 MG/5ML; MG/5ML
5 SYRUP ORAL EVERY 6 HOURS PRN
Qty: 120 ML | Refills: 0 | Status: SHIPPED | OUTPATIENT
Start: 2024-03-05

## 2024-03-05 NOTE — PROGRESS NOTES
Subjective:       Patient ID: Shima Wood is a 58 y.o. female.    Chief Complaint: Cough (Pt has persistent cough associated with congestion. Pt also has rash on the back of neck, c/o itching and redness, no pain. )    HPI: Shima is a 58 y.o. female who presents for annual visit. Daughter present  during visit.    Cough (Pt has persistent cough associated with congestion for two weeks. Pt also has rash on the back of neck, c/o itching and redness, no pain. )    Annual labs done last week and they are all within normal range.    Mrs. Wood has been suffering with C/O pain to her wrists, hands, and knees on most days which she notices is worse in the morning. She denies any family history of an autoimmune disorder. BIENVENIDO test came back positive so she was referred to Dr. Gutierrez for further evaluation with no issues found. She is now followed by Dr. Mcneal and was told she just has early stage of arthritis and was prescribed meloxicam but she is not taking it due to her not being in pain.    HLD - controlled well with STATIN daily, LDL improved greatly at 81 on last labs. She has been trying to eat healthier but does not exercise.    She does take a daily women's vitamin, turmeric, and magnesium.    Mammogram UTD.    She has been in menopause for 6 years. Followed by Dr. Delvalle for GYN care. PAP UTD.    Had a Colonoscopy done in 2022 at [facility] and had some polyps removed and told to repeat in 3 years, due in 2025. Home fecal test also done this past May and was negative.    Denies smoking or drinking.               Past Medical History:   Diagnosis Date    Hyperlipidemia        History reviewed. No pertinent surgical history.    History reviewed. No pertinent family history.    Social History     Tobacco Use    Smoking status: Never    Smokeless tobacco: Never       Patient Active Problem List   Diagnosis    Other hyperlipidemia    Seasonal allergies       Immunization History   Administered Date(s) Administered     "COVID-19 MRNA, LN-S PF (MODERNA HALF 0.25 ML DOSE) 04/12/2022    COVID-19, MRNA, LN-S, PF (MODERNA FULL 0.5 ML DOSE) 03/30/2021, 04/27/2021           Review of Systems   Constitutional:  Negative for activity change, appetite change, chills, diaphoresis, fatigue and fever.   HENT:  Negative for congestion, ear pain, sinus pain, tinnitus and trouble swallowing.    Eyes:  Negative for visual disturbance.   Respiratory:  Negative for cough, chest tightness, shortness of breath and wheezing.    Cardiovascular:  Negative for chest pain, palpitations and leg swelling.   Gastrointestinal:  Negative for abdominal distention, abdominal pain, blood in stool, constipation, diarrhea, nausea and vomiting.   Endocrine: Negative for cold intolerance, heat intolerance, polydipsia, polyphagia and polyuria.   Genitourinary:  Negative for decreased urine volume, dysuria, frequency, hematuria and urgency.   Musculoskeletal:  Positive for arthralgias (multiple joints). Negative for back pain, gait problem, joint swelling, neck pain and neck stiffness.   Skin:  Negative for color change and rash.   Neurological:  Negative for dizziness, syncope, weakness, light-headedness, numbness and headaches.   Hematological:  Negative for adenopathy. Does not bruise/bleed easily.   Psychiatric/Behavioral:  Negative for behavioral problems, confusion and dysphoric mood. The patient is not nervous/anxious.      Objective:     Vitals:    03/05/24 0935   BP: 110/74   BP Location: Right arm   Patient Position: Sitting   BP Method: Small (Automatic)   Pulse: 76   Resp: 18   Temp: 98.1 °F (36.7 °C)   TempSrc: Oral   SpO2: 98%   Weight: 59 kg (130 lb)   Height: 5' 2" (1.575 m)       Physical Exam  Vitals and nursing note reviewed.   Constitutional:       General: She is not in acute distress.     Appearance: Normal appearance. She is not diaphoretic.   HENT:      Head: Normocephalic and atraumatic.      Mouth/Throat:      Mouth: Mucous membranes are moist. "      Pharynx: Oropharynx is clear.   Eyes:      General:         Right eye: No discharge.         Left eye: No discharge.      Extraocular Movements: Extraocular movements intact.      Conjunctiva/sclera: Conjunctivae normal.      Pupils: Pupils are equal, round, and reactive to light.   Neck:      Thyroid: No thyromegaly or thyroid tenderness.   Cardiovascular:      Rate and Rhythm: Normal rate and regular rhythm.      Pulses: Normal pulses.      Heart sounds: Normal heart sounds.   Pulmonary:      Effort: Pulmonary effort is normal. No tachypnea or bradypnea.      Breath sounds: Normal breath sounds. No stridor. No wheezing or rales.   Abdominal:      General: Bowel sounds are normal. There is no distension.      Palpations: Abdomen is soft.      Tenderness: There is no abdominal tenderness. There is no guarding.   Musculoskeletal:         General: Tenderness (multiple joints) present. Normal range of motion.      Cervical back: Normal range of motion and neck supple.      Right lower leg: No edema.      Left lower leg: No edema.   Lymphadenopathy:      Cervical: No cervical adenopathy.   Skin:     General: Skin is warm and dry.      Capillary Refill: Capillary refill takes less than 2 seconds.      Findings: No rash.   Neurological:      General: No focal deficit present.      Mental Status: She is alert and oriented to person, place, and time.      Cranial Nerves: Cranial nerves 2-12 are intact.      Sensory: Sensation is intact.      Motor: Motor function is intact.      Coordination: Coordination is intact.      Gait: Gait is intact.      Deep Tendon Reflexes: Reflexes are normal and symmetric.   Psychiatric:         Mood and Affect: Mood and affect normal.         Speech: Speech normal.         Behavior: Behavior normal. Behavior is cooperative.         Thought Content: Thought content normal.         Cognition and Memory: Cognition and memory normal.         Judgment: Judgment normal.         Orders Only on  02/28/2024   Component Date Value Ref Range Status    Vit D, 25-Hydroxy 02/28/2024 30  >30 ng/mL Final   Orders Only on 02/28/2024   Component Date Value Ref Range Status    Estimated Avg Glucose 02/28/2024 119  mg/dL Final    Hemoglobin A1C 02/28/2024 5.5  4.2 - 6.3 % Final   Orders Only on 02/28/2024   Component Date Value Ref Range Status    Sodium 02/28/2024 135  131 - 143 mmol/L Final    Potassium 02/28/2024 4.4  3.5 - 5.1 mmol/L Final    Chloride 02/28/2024 104  98 - 107 mmol/L Final    CO2 02/28/2024 32  21 - 32 mmol/L Final    BUN 02/28/2024 15.0  7.0 - 18.0 mg/dL Final    Creatinine 02/28/2024 0.66  0.55 - 1.02 mg/dL Final    GFR ESTIMATION 02/28/2024 > 60  >60 Final    BUN/Creatinine Ratio 02/28/2024 22.72  Ratio Final    Glucose 02/28/2024 92  74 - 106 mg/dL Final    Calcium 02/28/2024 9.1  8.5 - 10.1 mg/dL Final    Total Bilirubin 02/28/2024 0.6  0.2 - 1.0 mg/dL Final    AST 02/28/2024 26  15 - 37 U/L Final    ALT 02/28/2024 46  13 - 56 U/L Final    Total Protein 02/28/2024 7.6  6.4 - 8.2 g/dL Final    Albumin 02/28/2024 4.0  3.4 - 5.0 g/dL Final    Alkaline Phosphatase 02/28/2024 85  45 - 117 U/L Final    Cholesterol 02/28/2024 179  <200 mg/dL Final    Triglycerides 02/28/2024 61  0 - 149 mg/dL Final    HDL 02/28/2024 67  >39 mg/dL Final    LDL Cholesterol 02/28/2024 99.8  mg/dL Final    VLDL 02/28/2024 12  mg/dL Final    TSH 02/28/2024 1.210  0.358 - 3.74 uIU/mL Final   Orders Only on 02/28/2024   Component Date Value Ref Range Status    Color, UA 02/28/2024 LtYellow  Yellow Final    Appearance, UA 02/28/2024 Clear  Clear Final    pH, UA 02/28/2024 6.0  5.0 - 8.0 pH Final    Specific [place], UA 02/28/2024 1.021  1.005 - 1.030 Final    Protein, UA 02/28/2024 Negative  Negative mg/dL Final    Glucose, UA 02/28/2024 Negative  Negative mg/dL Final    Ketones, UA 02/28/2024 Negative  Negative mg/dL Final    Occult Blood UA 02/28/2024 Negative  Negative Final    Nitrite, UA 02/28/2024 Negative  Negative  Final    Bilirubin (UA) 02/28/2024 Negative  Negative Final    Urobilinogen, UA 02/28/2024 Normal  Normal mg/dL Final    Leukocytes, UA 02/28/2024 25 (A)  Negative Fernando/uL Final    RBC, UA 02/28/2024 0-2  0 - 2 /HPF Final    WBC, UA 02/28/2024 0-2  0 - 2 /HPF Final    Squam Epithel, UA 02/28/2024 Rare  0 - 1+ /LPF Final    Bacteria 02/28/2024 None  0-+/- /HPF Final    MUCUS URINE 02/28/2024 +/-  0-1+ /LPF Final    Service Comment 03 02/28/2024 No, Criteria Not Met   Final   Orders Only on 02/28/2024   Component Date Value Ref Range Status    WBC 02/28/2024 5.0  4.5 - 10.0 10*3/uL Final    RBC 02/28/2024 4.34  4.20 - 5.40 10*6/uL Final    Hemoglobin 02/28/2024 13.6  12.0 - 16.0 g/dL Final    Hematocrit 02/28/2024 41.3  37.0 - 47.0 % Final    MCV 02/28/2024 95.2  80.0 - 99.0 fL Final    MCH 02/28/2024 31.3  27.0 - 32.0 pg Final    MCHC 02/28/2024 32.9  32.0 - 36.0 % Final    RDW RBC Auto-Rto 02/28/2024 12.5  0.0 - 15.5 % Final    Platelets 02/28/2024 359  130 - 400 10*3/uL Final    MPV 02/28/2024 9.3  9.2 - 12.2 fL Final    Neutrophils 02/28/2024 51.6  50 - 80 % Final    Immature Granulocytes 02/28/2024 0.60 (H)  0.0 - 0.43 % Final    Lymphocytes 02/28/2024 37.8  20.0 - 45.0 % Final    Monocytes 02/28/2024 6.8  2 - 10 % Final    Eosinophils 02/28/2024 2.4  0 - 6 % Final    Basophils 02/28/2024 0.8  0 - 3 % Final    nRBC# 02/28/2024 0.0  0 - 0.2 % Final    Neutrophils Absolute 02/28/2024 2.6  1.4 - 7.0 10*3/uL Final    Immature Granulocytes Absolute 02/28/2024 0.0300  0.0 - 0.0310 thou/uL Final    Lymphocytes Absolute 02/28/2024 1.9  1.2 - 4.0 10*3/uL Final    Monocytes Absolute 02/28/2024 0.3  0.1 - 0.8 10*3/uL Final    Eosinophils Absolute 02/28/2024 0.1  0.0 - 0.6 10*3/uL Final    Basophils Absolute 02/28/2024 0.0  0.0 - 0.3 10*3/uL Final    nRBC Count Absolute 02/28/2024 0.000  0 - 0.012 thou/uL Final   Patient Outreach on 09/20/2023   Component Date Value Ref Range Status    Recommendation: 10/03/2023 Repeat in one  year   Final    BCS Recommendation External 09/14/2023 Repeat mammogram in 1 year   Final         Assessment:      No diagnosis found.      Plan:     There are no diagnoses linked to this encounter.     Current Outpatient Medications   Medication Sig Dispense Refill    atorvastatin (LIPITOR) 20 MG tablet Take 1 tablet (20 mg total) by mouth every evening. 90 tablet 3    pantoprazole (PROTONIX) 20 MG tablet Take 1 tablet (20 mg total) by mouth once daily. 30 tablet 0     No current facility-administered medications for this visit.       There are no discontinued medications.    Health Maintenance   Topic Date Due    Hepatitis C Screening  Never done    TETANUS VACCINE  Never done    Shingles Vaccine (1 of 2) Never done    Colorectal Cancer Screening  05/30/2024    High Dose Statin  09/14/2024    Mammogram  10/03/2024    Lipid Panel  02/28/2029       There are no Patient Instructions on file for this visit.    Risks, benefits, and alternatives discussed with patient, Patient verbalized understanding of discussed plan of care. Asked patient if any further questions, answered no.    Future Appointments   Date Time Provider Department Center   9/17/2024  2:00 PM Rosalva Delvalle MD [id number] [place] NP

## 2024-03-05 NOTE — PATIENT INSTRUCTIONS
"RTC in 6 months for F/U or sooner if needed.    Patient Education       Low Cholesterol, Saturated Fat, and Trans Fat Diet   About this topic   Cholesterol, saturated fat, and trans fat are in many foods. These may raise your blood cholesterol levels. If your cholesterol is too high, this can cause health problems in your heart, liver, kidneys, and even your eyes. The key to lowering your risk of heart problems is to lower your bad fat intake.  Saturated fats and trans fats are the bad fats. These fats clog your arteries and raise your bad cholesterol. Saturated fats and trans fats are solid fats at room temperature. Saturated fats are animal fats. Trans fats are manmade fats. They add flavor to a lot of packaged foods. Staying away from saturated and trans fats will help your heart.  When you do eat foods with fat, make sure they have the good fats. Monounsaturated and polyunsaturated fats are good fats. These fats help raise your good cholesterol and protect your heart.  General   How to Lower Fat and Cholesterol in Your Diet   Read the labels of the foods you buy from the market to find out how much fat is present. Under 5% of total fat on a label means it is "low fat". Over 20% of total fat on a label means it is high fat.  Eat high fiber foods, like soluble fiber. This type of fiber helps lower cholesterol in the body. Choose oatmeal, fruits (like apples), beans, and nuts to get the most soluble fiber.  Eat foods high in omega-3 fatty acids like toshia seeds, walnuts, salmon, tuna, trout, herring, flaxseed, and soybeans. These foods help keep the heart healthy.  Limit your bad fat and oil intake.  Stay away from butter, stick margarine, shortening, lard, and palm and coconut oil. Pick plant-based spreads instead.  Limit mayonnaise, salad dressings, gravies, and sauces, unless it is made from low-fat ingredients.  Limit chocolate.  Do not eat high-fat processed foods like hot dogs, newell, sausage, ham and other " luncheon meats high in fat, and some frozen foods. Pick fish, chicken, turkey, and lean meats instead.  Eat more dried beans, lentils, and tofu to get your protein.  Do not eat organ meats, like liver.  Choose nonfat or low-fat milk, yogurt, and cheese.  Use light or fat-free cream cheese and sour cream.  Eat lots of fruits and vegetables.  Pick whole grain breads, cereals, pastas, and rice.  Do not eat snacks that are high in fats like granola, cookies, pies, pastries, doughnuts, and croissants.  Stay away from deep fried foods.  Help When Cooking   Remove the fat portion of meats and the skin from poultry before cooking.  Bake, broil, grill, poach, or roast poultry, fish, and lean meats.  Drain and throw away the fat that drains out of meat as you cook it.  Try to add little or no fat to foods.  Use olive or canola oil for cooking or baking.  Steam your vegetables.  Use herbs or no-oil marinades to flavor foods.         Who should use this diet?   This diet is for people who are at high risk of getting health problems like heart disease, high blood pressure, diabetes, and others. This diet is also good for all people to follow to keep your heart healthy.  What foods are good to eat?   Foods with good fats are:  Canola, peanut, and olive oil  Safflower, soybean, and corn oil  Walnuts, almonds, cashews, and peanuts  Pumpkin and sunflower seeds  [place] and tuna  Tofu  Soymilk  Avocado  What foods should be limited or avoided?   Stay away from these types of foods that have saturated fats:  Whole fat dairy products like cheese, ice cream, whole milk, and cream  Palm and coconut oils  High-fat meats like beef, lamb, poultry with the skin, newell, and sausage  Butter and lard  Stay away from these types of foods that may have trans fat:  Cookies, cakes, candy, doughnuts, baked goods, muffins, pizza dough, and pie crusts that are packaged  Fried foods  Frozen dinners  Chips and crackers  Microwave popcorn  Stick margarine  and vegetable shortenings  Helpful tips   To help stay away from saturated fat:  Pick lean cuts of meat  Take the skin off chicken and turkey or pick skinless  Pick low-fat cheese, milk, and ice cream  Use liquid oils when cooking and baking, such as olive oil and canola oil  To help stay away from trans fat:  Look at your labels. Choose foods with 0% trans fat. Read the ingredient list. Avoid foods with partially hydrogenated oil in the ingredient list. This means there is trans fat in the product.  Where can I learn more?   American Heart Association   http://www.heart.org/HEARTORG/Conditions/Cholesterol/PreventionTreatmentofHighCholesterol/Know-Your-Fats_[facility]_305628_Article.jsp   Last Reviewed Date   2021-10-05  Consumer Information Use and Disclaimer   This information is not specific medical advice and does not replace information you receive from your health care provider. This is only a brief summary of general information. It does NOT include all information about conditions, illnesses, injuries, tests, procedures, treatments, therapies, discharge instructions or life-style choices that may apply to you. You must talk with your health care provider for complete information about your health and treatment options. This information should not be used to decide whether or not to accept your health care providers advice, instructions or recommendations. Only your health care provider has the knowledge and training to provide advice that is right for you.   Copyright   Copyright © 2021 [employer] and its affiliates and/or licensors. All rights reserved.    Patient Education       Bacterial Upper Respiratory Infection, Adult   About this topic   Germs cause this health problem. You have signs in your nose, windpipe, voice box or larynx, throat, ears, or lungs that last for days or weeks. It often spreads from a person who is sick to some other person from close contact. This health problem may include:  Sore  throat. This is pharyngitis.  Swelling of the lining of the nose. This is rhinitis.  Swelling of the sinuses with pain in the face, forehead, or upper teeth. This is sinusitis.  Swelling of the nose and throat. This is nasopharyngitis.  Swelling of the upper part of the voice box. This is epiglottitis.  Swelling of the voice box. This is laryngitis.  Cough  What are the causes?   The main cause is an infection by certain germs.  What can make this more likely to happen?   Cold or winter season  Low immune system  Close contact with someone who has an upper respiratory infection  Allergies or asthma  Working in a school or day care center  What are the main signs?   Cough or sneezing  Sore throat  Runny or stuffy nose  Ear pain  Fever  Headache  Muscle pain  Tired  Weakness  How does the doctor diagnose this health problem?   Your doctor will do an exam and ask about your history. Your doctor will check your nose, throat, and lungs. The doctor may order:  Lab tests  Throat swab  Chest x-ray  CT or MRI scan  How does the doctor treat this health problem?   Your doctor may give you drugs to treat or prevent infection. You may also be given other drugs based on your condition. Talk to your doctor about what drugs you need to take.  What lifestyle changes are needed?   You need to rest while you are getting better. If your throat is sore, don't talk too much. This will rest your voice and throat. Drink plenty of fluids to stay hydrated.  What drugs may be needed?   The doctor may order drugs to:  Help with pain and swelling  Fight an infection  Dry up a stuffy nose  Stop wheezing  Control coughing  What can be done to prevent this health problem?   Wash your hands often with soap and water for at least 20 seconds, especially after coughing or sneezing. Alcohol-based hand sanitizers also work to kill the virus.  If you are sick, cover your mouth and nose with tissue when you cough or sneeze. You can also cough into your  elbow. Throw away tissues in the trash and wash your hands after touching used tissues.  Clean commonly handled things like door handles, remotes, toys, and phones. Wipe them with a disinfectant.  Do not get too close (kissing, hugging) to people who are sick.  Do not share food, drinks, towels, or hankies with anyone who is sick.  Stay away from crowded places.  Where can I learn more?   American Academy of Family Physicians  https://familydoctor.org/antibiotic-resistance/   American Academy of Family Physicians  https://familydoctor.org/condition/colds-and-the-flu/   Centers for Disease Control and Prevention  http://www.cdc.gov/getsmart/antibiotic-use/URI/index.html   NHS Choices  http://www.nhs.uk/conditions/Respiratory-tract-infection/Pages/Introduction.aspx   Last Reviewed Date   2020-01-24  Consumer Information Use and Disclaimer   This information is not specific medical advice and does not replace information you receive from your health care provider. This is only a brief summary of general information. It does NOT include all information about conditions, illnesses, injuries, tests, procedures, treatments, therapies, discharge instructions or life-style choices that may apply to you. You must talk with your health care provider for complete information about your health and treatment options. This information should not be used to decide whether or not to accept your health care providers advice, instructions or recommendations. Only your health care provider has the knowledge and training to provide advice that is right for you.  Copyright   Copyright © 2021 [employer] and its affiliates and/or licensors. All rights reserved.    Patient Education       Seasonal Allergies Discharge Instructions   About this topic   Seasonal allergies are also called allergic rhinitis or hay fever. You may have sneezing; a stuffy or runny nose; or itchy throat, ears, or eyes. You may feel very tired. Most often, this  problem is caused by:  Pollens from trees, grasses, or weeds.  Mold spores that grow when the air is humid, wet, or damp.  Some people can breathe in these things and have no problems. But when you have a seasonal allergy, your body acts as if the substance is harming you. Then you have symptoms. You may have symptoms only at some times of the year. If your symptoms last all year, they may be caused by:  Insects, such as dust mites or cock roaches.  Animals, such as cats or dogs.  Mold spores.     What care is needed at home?   Ask your doctor what you need to do when you go home. Make sure you ask questions if you do not understand what the doctor says.  Rinse your nose with salt water to get rid of pollen inside of your nose.  Keep the windows closed and use air conditioning.  Shower before bed to rinse pollen from your hair and skin.  Wear a dust mask if you need to be outside.  Use over-the-counter medicines to help with your symptoms:  A steroid nose spray can help with a stuffy nose. It can also help with drainage down the back of your throat.  An antihistamine can help with itching, sneezing, or runny nose.  An antihistamine eye drop can help with itchy eyes.  A decongestant can help with a stuffy nose. Talk with your doctor or pharmacist about your other health problems before you use this kind of medicine. It may not be safe for people with high blood pressure.  What follow-up care is needed?   Your doctor may ask you to make visits to the office to check on your progress. Your doctor may ask you to see an allergy specialist, or to have testing done to learn what is causing your allergies. Be sure to keep these visits.  What drugs may be needed?   The doctor may order drugs to treat the signs. Take your drugs as ordered. You may also take allergy shots if you have had allergy tests.  Will physical activity be limited?   You may have to limit your activity. If your health problem is caused by an allergy to  pollens or molds, you may want to limit your time outdoors. Talk to your doctor about what activities are best for you.  What problems could happen?   Your signs may not get better with your drugs  Asthma may get worse  Sinus infection  What can be done to prevent this health problem?   Try to avoid allergens.  If you are allergic to pollens, grasses, trees, etc:  Stay inside in the morning when pollen counts are high.  Avoid going outside when the trees, grasses, or weeds are blooming.  Keep the windows of your house and car closed.  Use an air conditioner.  If you are allergic to dust, molds, dust mites, pets, etc:  Clean your air conditioner or furnace filters regularly.  Cover pillows and mattresses with vinyl covers to lower your exposure to dust mites.  Wash bedding weekly in very hot water.  Use fewer dust-collecting items, such as curtains, bed skirts, carpeting, and stuffed animals, mainly in your bedroom.  If you can't avoid having a furry pet, vacuum often and keep your pet out of bedrooms and other rooms with carpets.  When do I need to call the doctor?   You are having so much trouble breathing that you can only say one or two words at a time.  You need to sit upright at all times to be able to breathe and or cannot lie down.  You have severe swelling of your tongue, lips, or mouth.  You have trouble breathing when talking or sitting still.  You have a fever of 100.4°F (38°C) or higher.  You have green or yellow mucus.  Teach Back: Helping You Understand   The Teach Back Method helps you understand the information we are giving you. After you talk with the staff, tell them in your own words what you learned. This helps to make sure the staff has described each thing clearly. It also helps to explain things that may have been confusing. Before going home, make sure you can do these:  I can tell you about my condition.  I can tell you what may help make the air .  I can tell you what may help ease  my allergies.  Where can I learn more?   Food and Drug Administration  https://www.fda.gov/ForConsumers/ConsumerUpdates/jdi303753.htm   NHS Choices  https://www.nhs.uk/conditions/Hay-fever/   Last Reviewed Date   2021-06-21  Consumer Information Use and Disclaimer   This information is not specific medical advice and does not replace information you receive from your health care provider. This is only a brief summary of general information. It does NOT include all information about conditions, illnesses, injuries, tests, procedures, treatments, therapies, discharge instructions or life-style choices that may apply to you. You must talk with your health care provider for complete information about your health and treatment options. This information should not be used to decide whether or not to accept your health care providers advice, instructions or recommendations. Only your health care provider has the knowledge and training to provide advice that is right for you.  Copyright   Copyright © 2021 [employer] and its affiliates and/or licensors. All rights reserved.

## 2024-03-20 ENCOUNTER — TELEPHONE (OUTPATIENT)
Dept: PHARMACY | Facility: CLINIC | Age: 59
End: 2024-03-20
Payer: COMMERCIAL

## 2024-04-11 ENCOUNTER — OFFICE VISIT (OUTPATIENT)
Dept: OBSTETRICS AND GYNECOLOGY | Facility: CLINIC | Age: 59
End: 2024-04-11
Payer: COMMERCIAL

## 2024-04-11 VITALS
WEIGHT: 130 LBS | BODY MASS INDEX: 23.78 KG/M2 | SYSTOLIC BLOOD PRESSURE: 104 MMHG | HEART RATE: 73 BPM | DIASTOLIC BLOOD PRESSURE: 70 MMHG

## 2024-04-11 DIAGNOSIS — N95.2 ATROPHIC VAGINITIS: Primary | ICD-10-CM

## 2024-04-11 PROCEDURE — 3078F DIAST BP <80 MM HG: CPT | Mod: CPTII,S$GLB,, | Performed by: OBSTETRICS & GYNECOLOGY

## 2024-04-11 PROCEDURE — 3074F SYST BP LT 130 MM HG: CPT | Mod: CPTII,S$GLB,, | Performed by: OBSTETRICS & GYNECOLOGY

## 2024-04-11 PROCEDURE — 3008F BODY MASS INDEX DOCD: CPT | Mod: CPTII,S$GLB,, | Performed by: OBSTETRICS & GYNECOLOGY

## 2024-04-11 PROCEDURE — 1159F MED LIST DOCD IN RCRD: CPT | Mod: CPTII,S$GLB,, | Performed by: OBSTETRICS & GYNECOLOGY

## 2024-04-11 PROCEDURE — 99214 OFFICE O/P EST MOD 30 MIN: CPT | Mod: S$GLB,,, | Performed by: OBSTETRICS & GYNECOLOGY

## 2024-04-11 PROCEDURE — 3044F HG A1C LEVEL LT 7.0%: CPT | Mod: CPTII,S$GLB,, | Performed by: OBSTETRICS & GYNECOLOGY

## 2024-04-11 NOTE — PROGRESS NOTES
Subjective:       Patient ID: Shima Wood is a 58 y.o. female.    Chief Complaint:  Consult  Visit done with      History of Present Illness  Pt here for vaginal pressure.  History and past labs reviewed with patient.    Complaints of vaginal burning and dryness with intercourse. Has been feeling a ball hanging in her vagina. Had a bad cold with cough and has had pressure sx and pain since.       Review of Systems  Review of Systems   Constitutional:  Negative for chills and fever.   Respiratory:  Negative for shortness of breath.    Cardiovascular:  Negative for chest pain.   Gastrointestinal:  Negative for abdominal pain, blood in stool, constipation, diarrhea, nausea, vomiting and reflux.   Genitourinary:  Positive for vaginal dryness. Negative for dysmenorrhea, dyspareunia, dysuria, hematuria, hot flashes, menorrhagia, menstrual problem, pelvic pain, vaginal bleeding, vaginal discharge and postcoital bleeding.   Musculoskeletal:  Negative for arthralgias and joint swelling.   Integumentary:  Negative for rash, hair changes, breast mass, nipple discharge and breast skin changes.   Psychiatric/Behavioral:  Negative for depression. The patient is not nervous/anxious.    Breast: Negative for asymmetry, lump, mass, nipple discharge and skin changes          Objective:     Vitals:    04/11/24 1337   BP: 104/70   Pulse: 73   Weight: 59 kg (130 lb)       Physical Exam:   Constitutional: She appears well-developed and well-nourished. No distress.    HENT:   Head: Normocephalic and atraumatic.    Eyes: Conjunctivae and EOM are normal.    Neck: No tracheal deviation present. No thyromegaly present.    Cardiovascular:       Exam reveals no clubbing, no cyanosis and no edema.        Pulmonary/Chest: Effort normal. No respiratory distress.        Abdominal: Soft. She exhibits no distension and no mass. There is no abdominal tenderness. There is no rebound and no guarding. No hernia.     Genitourinary:     Urethra, uterus and rectum normal.      Pelvic exam was performed with patient supine.   There is no rash, tenderness, lesion or injury on the right labia. There is no rash, tenderness, lesion or injury on the left labia. Cervix is normal. Right adnexum displays no mass, no tenderness and no fullness. Left adnexum displays no mass, no tenderness and no fullness. There is erythema and cystocele (grade 2 on valsalva) in the vagina. Vaginal atrophy noted. Uterus consistancy normal and Uerus contour normal  Uterus size: 8 cm.               Skin: She is not diaphoretic. No cyanosis. Nails show no clubbing.          Assessment:        1. Atrophic vaginitis             Plan:      Discussed vaginal atrophy   Start premarin cream for burning and pain   Briefly discussed surgery if symptoms not relived by Estrogen   RTC 1 month

## 2024-05-14 ENCOUNTER — OFFICE VISIT (OUTPATIENT)
Dept: OBSTETRICS AND GYNECOLOGY | Facility: CLINIC | Age: 59
End: 2024-05-14
Payer: COMMERCIAL

## 2024-05-14 VITALS
HEART RATE: 80 BPM | SYSTOLIC BLOOD PRESSURE: 110 MMHG | WEIGHT: 133.63 LBS | DIASTOLIC BLOOD PRESSURE: 76 MMHG | BODY MASS INDEX: 24.44 KG/M2

## 2024-05-14 DIAGNOSIS — N81.4 UTERINE PROLAPSE: ICD-10-CM

## 2024-05-14 DIAGNOSIS — N95.2 ATROPHIC VAGINITIS: Primary | ICD-10-CM

## 2024-05-14 PROCEDURE — 1159F MED LIST DOCD IN RCRD: CPT | Mod: CPTII,S$GLB,, | Performed by: OBSTETRICS & GYNECOLOGY

## 2024-05-14 PROCEDURE — 3008F BODY MASS INDEX DOCD: CPT | Mod: CPTII,S$GLB,, | Performed by: OBSTETRICS & GYNECOLOGY

## 2024-05-14 PROCEDURE — 99213 OFFICE O/P EST LOW 20 MIN: CPT | Mod: S$GLB,,, | Performed by: OBSTETRICS & GYNECOLOGY

## 2024-05-14 PROCEDURE — 3074F SYST BP LT 130 MM HG: CPT | Mod: CPTII,S$GLB,, | Performed by: OBSTETRICS & GYNECOLOGY

## 2024-05-14 PROCEDURE — 3044F HG A1C LEVEL LT 7.0%: CPT | Mod: CPTII,S$GLB,, | Performed by: OBSTETRICS & GYNECOLOGY

## 2024-05-14 PROCEDURE — 3078F DIAST BP <80 MM HG: CPT | Mod: CPTII,S$GLB,, | Performed by: OBSTETRICS & GYNECOLOGY

## 2024-05-14 NOTE — PROGRESS NOTES
Subjective:       Patient ID: Shima Wood is a 58 y.o. female.    Chief Complaint:  Follow-up  Visit done with      History of Present Illness  Pt here for vaginal pressure.  History and past labs reviewed with patient.    Complaints of vaginal burning that is improved.        Review of Systems  Review of Systems   Constitutional:  Negative for chills and fever.   Respiratory:  Negative for shortness of breath.    Cardiovascular:  Negative for chest pain.   Gastrointestinal:  Negative for abdominal pain, blood in stool, constipation, diarrhea, nausea, vomiting and reflux.   Genitourinary:  Negative for dysmenorrhea, dyspareunia, dysuria, hematuria, hot flashes, menorrhagia, menstrual problem, pelvic pain, vaginal bleeding, vaginal discharge, postcoital bleeding and vaginal dryness.   Musculoskeletal:  Negative for arthralgias and joint swelling.   Integumentary:  Negative for rash, hair changes, breast mass, nipple discharge and breast skin changes.   Psychiatric/Behavioral:  Negative for depression. The patient is not nervous/anxious.    Breast: Negative for asymmetry, lump, mass, nipple discharge and skin changes          Objective:     Vitals:    05/14/24 1301   BP: 110/76   Pulse: 80   Weight: 60.6 kg (133 lb 9.6 oz)       Physical Exam:   Constitutional: She appears well-developed and well-nourished. No distress.    HENT:   Head: Normocephalic and atraumatic.    Eyes: Conjunctivae and EOM are normal.    Neck: No tracheal deviation present. No thyromegaly present.    Cardiovascular:       Exam reveals no clubbing, no cyanosis and no edema.        Pulmonary/Chest: Effort normal. No respiratory distress.        Abdominal: Soft. She exhibits no distension and no mass. There is no abdominal tenderness. There is no rebound and no guarding. No hernia.     Genitourinary:    Urethra, uterus and rectum normal.      Pelvic exam was performed with patient supine.   There is no rash, tenderness, lesion  or injury on the right labia. There is no rash, tenderness, lesion or injury on the left labia. Cervix is normal. Right adnexum displays no mass, no tenderness and no fullness. Left adnexum displays no mass, no tenderness and no fullness. There is erythema and cystocele (grade 2 on valsalva) in the vagina. Vaginal atrophy noted. Uterus consistancy normal and Uerus contour normal  Uterus size: 8 cm.               Skin: She is not diaphoretic. No cyanosis. Nails show no clubbing.          Assessment:        1. Atrophic vaginitis    2. Uterine prolapse               Plan:      Discussed vaginal atrophy   Continue premarin cream   Pt given reassurance if sx persists RTC for evaluation

## 2024-09-05 ENCOUNTER — CLINICAL SUPPORT (OUTPATIENT)
Dept: OBSTETRICS AND GYNECOLOGY | Facility: CLINIC | Age: 59
End: 2024-09-05
Payer: COMMERCIAL

## 2024-09-05 ENCOUNTER — OFFICE VISIT (OUTPATIENT)
Dept: PRIMARY CARE CLINIC | Facility: CLINIC | Age: 59
End: 2024-09-05
Payer: COMMERCIAL

## 2024-09-05 VITALS
BODY MASS INDEX: 24.29 KG/M2 | OXYGEN SATURATION: 98 % | RESPIRATION RATE: 18 BRPM | SYSTOLIC BLOOD PRESSURE: 111 MMHG | DIASTOLIC BLOOD PRESSURE: 71 MMHG | WEIGHT: 132 LBS | HEART RATE: 77 BPM | TEMPERATURE: 98 F | HEIGHT: 62 IN

## 2024-09-05 DIAGNOSIS — J30.2 SEASONAL ALLERGIES: ICD-10-CM

## 2024-09-05 DIAGNOSIS — Z78.0 POSTMENOPAUSAL: ICD-10-CM

## 2024-09-05 DIAGNOSIS — E78.49 OTHER HYPERLIPIDEMIA: Primary | ICD-10-CM

## 2024-09-05 DIAGNOSIS — Z01.89 ROUTINE LAB DRAW: Primary | ICD-10-CM

## 2024-09-05 DIAGNOSIS — K21.00 GASTROESOPHAGEAL REFLUX DISEASE WITH ESOPHAGITIS WITHOUT HEMORRHAGE: ICD-10-CM

## 2024-09-05 DIAGNOSIS — Z12.31 BREAST CANCER SCREENING BY MAMMOGRAM: ICD-10-CM

## 2024-09-05 LAB
CHOLEST SERPL-MSCNC: 223 MG/DL
HDL/CHOLESTEROL RATIO: 2.8 RATIO
HDLC SERPL-MCNC: 79 MG/DL
LDLC SERPL CALC-MCNC: 125.2 MG/DL
TRIGL SERPL-MCNC: 94 MG/DL (ref 0–149)
VLDL CHOLESTEROL: 19 MG/DL

## 2024-09-05 NOTE — PROGRESS NOTES
Subjective:       Patient ID: Shima Wood is a 59 y.o. female.    Chief Complaint: Follow-up    HPI: Shima is a 59 y.o. female who presents for 6 month F/U. Daughter present  during visit.    Mrs. Wood feels well today with no new issues or concerns.    Annual labs done at last visit and they are were within normal range.    Mrs. Wood has been suffering with C/O pain to her wrists, hands, and knees on most days which she notices is worse in the morning. She denies any family history of an autoimmune disorder. BIENVENIDO test came back positive so she was referred to Dr. Gutierrez for further evaluation with no issues found. She is now followed by Dr. Mcneal and was told she just has early stage of arthritis and was prescribed meloxicam but she is not taking it due to her not being in pain.    HLD - controlled well with STATIN daily, LDL improved greatly at 81 on last labs. She has been trying to eat healthier but does not exercise.    She does take a daily women's vitamin, turmeric, and magnesium.    She has been in menopause for 6 years. She is followed by Dr. Delvalle and is prescribed estradiol cream which she says she uses twice monthly and it helps. PAP UTD but Mammogram is due next month, will order today.    Had a Colonoscopy done in 2022 at [facility] and had some polyps removed and told to repeat in 3 years, due in 2025. Home fecal test also done this past May and was negative.    Denies smoking or drinking.               Past Medical History:   Diagnosis Date    Hyperlipidemia        History reviewed. No pertinent surgical history.    No family history on file.    Social History     Tobacco Use    Smoking status: Never    Smokeless tobacco: Never   Substance Use Topics    Alcohol use: Never       Patient Active Problem List   Diagnosis    Other hyperlipidemia    Seasonal allergies    Gastroesophageal reflux disease with esophagitis without hemorrhage       Immunization History   Administered Date(s) Administered  "   COVID-19 MRNA, LN-S PF (MODERNA HALF 0.25 ML DOSE) 04/12/2022    COVID-19, MRNA, LN-S, PF (MODERNA FULL 0.5 ML DOSE) 03/30/2021, 04/27/2021           Review of Systems   Constitutional:  Negative for activity change, appetite change, chills, diaphoresis, fatigue and fever.   HENT:  Negative for congestion, ear pain, sinus pain, tinnitus and trouble swallowing.    Eyes:  Negative for visual disturbance.   Respiratory:  Negative for cough, chest tightness, shortness of breath and wheezing.    Cardiovascular:  Negative for chest pain, palpitations and leg swelling.   Gastrointestinal:  Negative for abdominal distention, abdominal pain, blood in stool, constipation, diarrhea, nausea and vomiting.   Genitourinary:  Negative for decreased urine volume, dysuria, frequency, hematuria and urgency.   Musculoskeletal:  Negative for gait problem, neck pain and neck stiffness.   Skin:  Negative for color change and rash.   Allergic/Immunologic: Positive for environmental allergies.   Neurological:  Negative for dizziness, syncope, weakness, light-headedness, numbness and headaches.   Psychiatric/Behavioral:  Negative for behavioral problems, confusion and dysphoric mood. The patient is not nervous/anxious.      Objective:     Vitals:    09/05/24 0951   BP: 111/71   BP Location: Right arm   Patient Position: Sitting   BP Method: Small (Automatic)   Pulse: 77   Resp: 18   Temp: 98 °F (36.7 °C)   TempSrc: Oral   SpO2: 98%   Weight: 59.9 kg (132 lb)   Height: 5' 2" (1.575 m)       Physical Exam  Vitals and nursing note reviewed.   Constitutional:       General: She is not in acute distress.     Appearance: Normal appearance. She is not diaphoretic.   HENT:      Head: Normocephalic and atraumatic.      Mouth/Throat:      Mouth: Mucous membranes are moist.      Pharynx: Oropharynx is clear.   Eyes:      Extraocular Movements: Extraocular movements intact.      Conjunctiva/sclera: Conjunctivae normal.   Cardiovascular:      Rate and " Rhythm: Normal rate and regular rhythm.   Pulmonary:      Effort: Pulmonary effort is normal.      Breath sounds: Normal breath sounds. No stridor. No wheezing or rales.   Abdominal:      General: Bowel sounds are normal. There is no distension.      Palpations: Abdomen is soft.      Tenderness: There is no abdominal tenderness.   Musculoskeletal:         General: No tenderness. Normal range of motion.      Cervical back: Normal range of motion.      Right lower leg: No edema.      Left lower leg: No edema.   Lymphadenopathy:      Cervical: No cervical adenopathy.   Skin:     General: Skin is warm and dry.      Capillary Refill: Capillary refill takes less than 2 seconds.      Findings: No rash.   Neurological:      Mental Status: She is alert and oriented to person, place, and time.   Psychiatric:         Mood and Affect: Mood and affect normal.         Behavior: Behavior normal. Behavior is cooperative.         No visits with results within 6 Month(s) from this visit.   Latest known visit with results is:   Orders Only on 02/28/2024   Component Date Value Ref Range Status    Vit D, 25-Hydroxy 02/28/2024 30  >30 ng/mL Final         Assessment:      1. Other hyperlipidemia Well controlled   2. Gastroesophageal reflux disease with esophagitis without hemorrhage Well controlled   3. Seasonal allergies Stable   4. Postmenopausal Stable   5. Breast cancer screening by mammogram          Plan:     Other hyperlipidemia  Comments:  continue STATIN daily along with eat a low cholesterol diet and exercise regularly, lipid panel drawn today, will see results.    Gastroesophageal reflux disease with esophagitis without hemorrhage  Comments:  continue controlling with appropriate diet, avoid large meals and avoid lying down after meals    Seasonal allergies  Comments:  continue clarinex as needed    Postmenopausal  Comments:  continue using estradiol cream    Breast cancer screening by mammogram  -     Mammo Digital Screening  [facility] [place]; Future; Expected date: 09/05/2024         Current Outpatient Medications   Medication Sig Dispense Refill    atorvastatin (LIPITOR) 20 MG tablet Take 1 tablet (20 mg total) by mouth every evening. 90 tablet 3    conjugated estrogens (PREMARIN) vaginal cream Place 1 g vaginally once daily. 30 g 5    desloratadine (CLARINEX) 5 mg tablet Take 1 tablet (5 mg total) by mouth once daily. 30 tablet 5    triamcinolone acetonide 0.1% (KENALOG) 0.1 % cream Apply topically 2 (two) times daily. 45 g 1     No current facility-administered medications for this visit.       Medications Discontinued During This Encounter   Medication Reason    pantoprazole (PROTONIX) 20 MG tablet Patient no longer taking    promethazine-dextromethorphan (PROMETHAZINE-DM) 6.25-15 mg/5 mL Syrp Patient no longer taking       Health Maintenance   Topic Date Due    TETANUS VACCINE  Never done    Shingles Vaccine (1 of 2) Never done    Colorectal Cancer Screening  05/30/2024    Mammogram  10/03/2024    Lipid Panel  02/28/2029    Hepatitis C Screening  Completed       Patient Instructions   RTC in 6 months for F/U or sooner if needed.    Keep appts with specialists as scheduled.    Patient Education       Low Cholesterol, Saturated Fat, and Trans Fat Diet   About this topic   Cholesterol, saturated fat, and trans fat are in many foods. These may raise your blood cholesterol levels. If your cholesterol is too high, this can cause health problems in your heart, liver, kidneys, and even your eyes. The key to lowering your risk of heart problems is to lower your bad fat intake.  Saturated fats and trans fats are the bad fats. These fats clog your arteries and raise your bad cholesterol. Saturated fats and trans fats are solid fats at room temperature. Saturated fats are animal fats. Trans fats are manmade fats. They add flavor to a lot of packaged foods. Staying away from saturated and trans fats will help your heart.  When you do eat foods  "with fat, make sure they have the good fats. Monounsaturated and polyunsaturated fats are good fats. These fats help raise your good cholesterol and protect your heart.  General   How to Lower Fat and Cholesterol in Your Diet   Read the labels of the foods you buy from the market to find out how much fat is present. Under 5% of total fat on a label means it is "low fat". Over 20% of total fat on a label means it is high fat.  Eat high fiber foods, like soluble fiber. This type of fiber helps lower cholesterol in the body. Choose oatmeal, fruits (like apples), beans, and nuts to get the most soluble fiber.  Eat foods high in omega-3 fatty acids like toshia seeds, walnuts, salmon, tuna, trout, herring, flaxseed, and soybeans. These foods help keep the heart healthy.  Limit your bad fat and oil intake.  Stay away from butter, stick margarine, shortening, lard, and palm and coconut oil. Pick plant-based spreads instead.  Limit mayonnaise, salad dressings, gravies, and sauces, unless it is made from low-fat ingredients.  Limit chocolate.  Do not eat high-fat processed foods like hot dogs, newell, sausage, ham and other luncheon meats high in fat, and some frozen foods. Pick fish, chicken, turkey, and lean meats instead.  Eat more dried beans, lentils, and tofu to get your protein.  Do not eat organ meats, like liver.  Choose nonfat or low-fat milk, yogurt, and cheese.  Use light or fat-free cream cheese and sour cream.  Eat lots of fruits and vegetables.  Pick whole grain breads, cereals, pastas, and rice.  Do not eat snacks that are high in fats like granola, cookies, pies, pastries, doughnuts, and croissants.  Stay away from deep fried foods.  Help When Cooking   Remove the fat portion of meats and the skin from poultry before cooking.  Bake, broil, grill, poach, or roast poultry, fish, and lean meats.  Drain and throw away the fat that drains out of meat as you cook it.  Try to add little or no fat to foods.  Use olive " or canola oil for cooking or baking.  Steam your vegetables.  Use herbs or no-oil marinades to flavor foods.         Who should use this diet?   This diet is for people who are at high risk of getting health problems like heart disease, high blood pressure, diabetes, and others. This diet is also good for all people to follow to keep your heart healthy.  What foods are good to eat?   Foods with good fats are:  Canola, peanut, and olive oil  Safflower, soybean, and corn oil  Walnuts, almonds, cashews, and peanuts  Pumpkin and sunflower seeds  [place] and tuna  Tofu  Soymilk  Avocado  What foods should be limited or avoided?   Stay away from these types of foods that have saturated fats:  Whole fat dairy products like cheese, ice cream, whole milk, and cream  Palm and coconut oils  High-fat meats like beef, lamb, poultry with the skin, newell, and sausage  Butter and lard  Stay away from these types of foods that may have trans fat:  Cookies, cakes, candy, doughnuts, baked goods, muffins, pizza dough, and pie crusts that are packaged  Fried foods  Frozen dinners  Chips and crackers  Microwave popcorn  Stick margarine and vegetable shortenings  Helpful tips   To help stay away from saturated fat:  Pick lean cuts of meat  Take the skin off chicken and turkey or pick skinless  Pick low-fat cheese, milk, and ice cream  Use liquid oils when cooking and baking, such as olive oil and canola oil  To help stay away from trans fat:  Look at your labels. Choose foods with 0% trans fat. Read the ingredient list. Avoid foods with partially hydrogenated oil in the ingredient list. This means there is trans fat in the product.  Where can I learn more?   American Heart Association   http://www.heart.org/HEARTORG/Conditions/Cholesterol/PreventionTreatmentofHighCholesterol/Know-Your-Fats_[facility]_305628_Article.jsp   Last Reviewed Date   2021-10-05  Consumer Information Use and Disclaimer   This information is not specific medical advice and  does not replace information you receive from your health care provider. This is only a brief summary of general information. It does NOT include all information about conditions, illnesses, injuries, tests, procedures, treatments, therapies, discharge instructions or life-style choices that may apply to you. You must talk with your health care provider for complete information about your health and treatment options. This information should not be used to decide whether or not to accept your health care providers advice, instructions or recommendations. Only your health care provider has the knowledge and training to provide advice that is right for you.   Copyright   Copyright © 2021 UpToDate, Inc. and its affiliates and/or licensors. All rights reserved.    Patient Education       Seasonal Allergies Discharge Instructions   About this topic   Seasonal allergies are also called allergic rhinitis or hay fever. You may have sneezing; a stuffy or runny nose; or itchy throat, ears, or eyes. You may feel very tired. Most often, this problem is caused by:  Pollens from trees, grasses, or weeds.  Mold spores that grow when the air is humid, wet, or damp.  Some people can breathe in these things and have no problems. But when you have a seasonal allergy, your body acts as if the substance is harming you. Then you have symptoms. You may have symptoms only at some times of the year. If your symptoms last all year, they may be caused by:  Insects, such as dust mites or cock roaches.  Animals, such as cats or dogs.  Mold spores.     What care is needed at home?   Ask your doctor what you need to do when you go home. Make sure you ask questions if you do not understand what the doctor says.  Rinse your nose with salt water to get rid of pollen inside of your nose.  Keep the windows closed and use air conditioning.  Shower before bed to rinse pollen from your hair and skin.  Wear a dust mask if you need to be outside.  Use  over-the-counter medicines to help with your symptoms:  A steroid nose spray can help with a stuffy nose. It can also help with drainage down the back of your throat.  An antihistamine can help with itching, sneezing, or runny nose.  An antihistamine eye drop can help with itchy eyes.  A decongestant can help with a stuffy nose. Talk with your doctor or pharmacist about your other health problems before you use this kind of medicine. It may not be safe for people with high blood pressure.  What follow-up care is needed?   Your doctor may ask you to make visits to the office to check on your progress. Your doctor may ask you to see an allergy specialist, or to have testing done to learn what is causing your allergies. Be sure to keep these visits.  What drugs may be needed?   The doctor may order drugs to treat the signs. Take your drugs as ordered. You may also take allergy shots if you have had allergy tests.  Will physical activity be limited?   You may have to limit your activity. If your health problem is caused by an allergy to pollens or molds, you may want to limit your time outdoors. Talk to your doctor about what activities are best for you.  What problems could happen?   Your signs may not get better with your drugs  Asthma may get worse  Sinus infection  What can be done to prevent this health problem?   Try to avoid allergens.  If you are allergic to pollens, grasses, trees, etc:  Stay inside in the morning when pollen counts are high.  Avoid going outside when the trees, grasses, or weeds are blooming.  Keep the windows of your house and car closed.  Use an air conditioner.  If you are allergic to dust, molds, dust mites, pets, etc:  Clean your air conditioner or furnace filters regularly.  Cover pillows and mattresses with vinyl covers to lower your exposure to dust mites.  Wash bedding weekly in very hot water.  Use fewer dust-collecting items, such as curtains, bed skirts, carpeting, and stuffed  animals, mainly in your bedroom.  If you can't avoid having a furry pet, vacuum often and keep your pet out of bedrooms and other rooms with carpets.  When do I need to call the doctor?   You are having so much trouble breathing that you can only say one or two words at a time.  You need to sit upright at all times to be able to breathe and or cannot lie down.  You have severe swelling of your tongue, lips, or mouth.  You have trouble breathing when talking or sitting still.  You have a fever of 100.4°F (38°C) or higher.  You have green or yellow mucus.  Teach Back: Helping You Understand   The Teach Back Method helps you understand the information we are giving you. After you talk with the staff, tell them in your own words what you learned. This helps to make sure the staff has described each thing clearly. It also helps to explain things that may have been confusing. Before going home, make sure you can do these:  I can tell you about my condition.  I can tell you what may help make the air .  I can tell you what may help ease my allergies.  Where can I learn more?   Food and Drug Administration  https://www.fda.gov/ForConsumers/ConsumerUpdates/vum070021.htm   NHS Choices  https://www.nhs.uk/conditions/Hay-fever/   Last Reviewed Date   2021-06-21  Consumer Information Use and Disclaimer   This information is not specific medical advice and does not replace information you receive from your health care provider. This is only a brief summary of general information. It does NOT include all information about conditions, illnesses, injuries, tests, procedures, treatments, therapies, discharge instructions or life-style choices that may apply to you. You must talk with your health care provider for complete information about your health and treatment options. This information should not be used to decide whether or not to accept your health care providers advice, instructions or recommendations. Only your  health care provider has the knowledge and training to provide advice that is right for you.  Copyright   Copyright © 2021 UpToDate, [employer]. and its affiliates and/or licensors. All rights reserved.        Risks, benefits, and alternatives discussed with patient, Patient verbalized understanding of discussed plan of care. Asked patient if any further questions, answered no.    Future Appointments   Date Time Provider Department Center   9/5/2024 11:05 AM LAB, [facility] OB [address] [id number]  [place]   9/17/2024  2:00 PM Rosalva Delvalle MD [facility] [id number] [facility]   3/5/2025  9:40 AM Bertha Stein, NP [facility] [id number]  [place] Stein NP

## 2024-09-05 NOTE — PATIENT INSTRUCTIONS
"RTC in 6 months for F/U or sooner if needed.    Keep appts with specialists as scheduled.    Patient Education       Low Cholesterol, Saturated Fat, and Trans Fat Diet   About this topic   Cholesterol, saturated fat, and trans fat are in many foods. These may raise your blood cholesterol levels. If your cholesterol is too high, this can cause health problems in your heart, liver, kidneys, and even your eyes. The key to lowering your risk of heart problems is to lower your bad fat intake.  Saturated fats and trans fats are the bad fats. These fats clog your arteries and raise your bad cholesterol. Saturated fats and trans fats are solid fats at room temperature. Saturated fats are animal fats. Trans fats are manmade fats. They add flavor to a lot of packaged foods. Staying away from saturated and trans fats will help your heart.  When you do eat foods with fat, make sure they have the good fats. Monounsaturated and polyunsaturated fats are good fats. These fats help raise your good cholesterol and protect your heart.  General   How to Lower Fat and Cholesterol in Your Diet   Read the labels of the foods you buy from the market to find out how much fat is present. Under 5% of total fat on a label means it is "low fat". Over 20% of total fat on a label means it is high fat.  Eat high fiber foods, like soluble fiber. This type of fiber helps lower cholesterol in the body. Choose oatmeal, fruits (like apples), beans, and nuts to get the most soluble fiber.  Eat foods high in omega-3 fatty acids like toshia seeds, walnuts, salmon, tuna, trout, herring, flaxseed, and soybeans. These foods help keep the heart healthy.  Limit your bad fat and oil intake.  Stay away from butter, stick margarine, shortening, lard, and palm and coconut oil. Pick plant-based spreads instead.  Limit mayonnaise, salad dressings, gravies, and sauces, unless it is made from low-fat ingredients.  Limit chocolate.  Do not eat high-fat processed foods " like hot dogs, newell, sausage, ham and other luncheon meats high in fat, and some frozen foods. Pick fish, chicken, turkey, and lean meats instead.  Eat more dried beans, lentils, and tofu to get your protein.  Do not eat organ meats, like liver.  Choose nonfat or low-fat milk, yogurt, and cheese.  Use light or fat-free cream cheese and sour cream.  Eat lots of fruits and vegetables.  Pick whole grain breads, cereals, pastas, and rice.  Do not eat snacks that are high in fats like granola, cookies, pies, pastries, doughnuts, and croissants.  Stay away from deep fried foods.  Help When Cooking   Remove the fat portion of meats and the skin from poultry before cooking.  Bake, broil, grill, poach, or roast poultry, fish, and lean meats.  Drain and throw away the fat that drains out of meat as you cook it.  Try to add little or no fat to foods.  Use olive or canola oil for cooking or baking.  Steam your vegetables.  Use herbs or no-oil marinades to flavor foods.         Who should use this diet?   This diet is for people who are at high risk of getting health problems like heart disease, high blood pressure, diabetes, and others. This diet is also good for all people to follow to keep your heart healthy.  What foods are good to eat?   Foods with good fats are:  Canola, peanut, and olive oil  Safflower, soybean, and corn oil  Walnuts, almonds, cashews, and peanuts  Pumpkin and sunflower seeds  [place] and tuna  Tofu  Soymilk  Avocado  What foods should be limited or avoided?   Stay away from these types of foods that have saturated fats:  Whole fat dairy products like cheese, ice cream, whole milk, and cream  Palm and coconut oils  High-fat meats like beef, lamb, poultry with the skin, newell, and sausage  Butter and lard  Stay away from these types of foods that may have trans fat:  Cookies, cakes, candy, doughnuts, baked goods, muffins, pizza dough, and pie crusts that are packaged  Fried foods  Frozen dinners  Chips and  crackers  Microwave popcorn  Stick margarine and vegetable shortenings  Helpful tips   To help stay away from saturated fat:  Pick lean cuts of meat  Take the skin off chicken and turkey or pick skinless  Pick low-fat cheese, milk, and ice cream  Use liquid oils when cooking and baking, such as olive oil and canola oil  To help stay away from trans fat:  Look at your labels. Choose foods with 0% trans fat. Read the ingredient list. Avoid foods with partially hydrogenated oil in the ingredient list. This means there is trans fat in the product.  Where can I learn more?   American Heart Association   http://www.heart.org/HEARTORG/Conditions/Cholesterol/PreventionTreatmentofHighCholesterol/Know-Your-Fats_[facility]_305628_Article.jsp   Last Reviewed Date   2021-10-05  Consumer Information Use and Disclaimer   This information is not specific medical advice and does not replace information you receive from your health care provider. This is only a brief summary of general information. It does NOT include all information about conditions, illnesses, injuries, tests, procedures, treatments, therapies, discharge instructions or life-style choices that may apply to you. You must talk with your health care provider for complete information about your health and treatment options. This information should not be used to decide whether or not to accept your health care providers advice, instructions or recommendations. Only your health care provider has the knowledge and training to provide advice that is right for you.   Copyright   Copyright © 2021 Up[employer], Inc. and its affiliates and/or licensors. All rights reserved.    Patient Education       Seasonal Allergies Discharge Instructions   About this topic   Seasonal allergies are also called allergic rhinitis or hay fever. You may have sneezing; a stuffy or runny nose; or itchy throat, ears, or eyes. You may feel very tired. Most often, this problem is caused by:  Pollens from  trees, grasses, or weeds.  Mold spores that grow when the air is humid, wet, or damp.  Some people can breathe in these things and have no problems. But when you have a seasonal allergy, your body acts as if the substance is harming you. Then you have symptoms. You may have symptoms only at some times of the year. If your symptoms last all year, they may be caused by:  Insects, such as dust mites or cock roaches.  Animals, such as cats or dogs.  Mold spores.     What care is needed at home?   Ask your doctor what you need to do when you go home. Make sure you ask questions if you do not understand what the doctor says.  Rinse your nose with salt water to get rid of pollen inside of your nose.  Keep the windows closed and use air conditioning.  Shower before bed to rinse pollen from your hair and skin.  Wear a dust mask if you need to be outside.  Use over-the-counter medicines to help with your symptoms:  A steroid nose spray can help with a stuffy nose. It can also help with drainage down the back of your throat.  An antihistamine can help with itching, sneezing, or runny nose.  An antihistamine eye drop can help with itchy eyes.  A decongestant can help with a stuffy nose. Talk with your doctor or pharmacist about your other health problems before you use this kind of medicine. It may not be safe for people with high blood pressure.  What follow-up care is needed?   Your doctor may ask you to make visits to the office to check on your progress. Your doctor may ask you to see an allergy specialist, or to have testing done to learn what is causing your allergies. Be sure to keep these visits.  What drugs may be needed?   The doctor may order drugs to treat the signs. Take your drugs as ordered. You may also take allergy shots if you have had allergy tests.  Will physical activity be limited?   You may have to limit your activity. If your health problem is caused by an allergy to pollens or molds, you may want to  limit your time outdoors. Talk to your doctor about what activities are best for you.  What problems could happen?   Your signs may not get better with your drugs  Asthma may get worse  Sinus infection  What can be done to prevent this health problem?   Try to avoid allergens.  If you are allergic to pollens, grasses, trees, etc:  Stay inside in the morning when pollen counts are high.  Avoid going outside when the trees, grasses, or weeds are blooming.  Keep the windows of your house and car closed.  Use an air conditioner.  If you are allergic to dust, molds, dust mites, pets, etc:  Clean your air conditioner or furnace filters regularly.  Cover pillows and mattresses with vinyl covers to lower your exposure to dust mites.  Wash bedding weekly in very hot water.  Use fewer dust-collecting items, such as curtains, bed skirts, carpeting, and stuffed animals, mainly in your bedroom.  If you can't avoid having a furry pet, vacuum often and keep your pet out of bedrooms and other rooms with carpets.  When do I need to call the doctor?   You are having so much trouble breathing that you can only say one or two words at a time.  You need to sit upright at all times to be able to breathe and or cannot lie down.  You have severe swelling of your tongue, lips, or mouth.  You have trouble breathing when talking or sitting still.  You have a fever of 100.4°F (38°C) or higher.  You have green or yellow mucus.  Teach Back: Helping You Understand   The Teach Back Method helps you understand the information we are giving you. After you talk with the staff, tell them in your own words what you learned. This helps to make sure the staff has described each thing clearly. It also helps to explain things that may have been confusing. Before going home, make sure you can do these:  I can tell you about my condition.  I can tell you what may help make the air .  I can tell you what may help ease my allergies.  Where can I learn  more?   Food and Drug Administration  https://www.fda.gov/ForConsumers/ConsumerUpdates/iwi839508.htm   NHS Choices  https://www.nhs.uk/conditions/Hay-fever/   Last Reviewed Date   2021-06-21  Consumer Information Use and Disclaimer   This information is not specific medical advice and does not replace information you receive from your health care provider. This is only a brief summary of general information. It does NOT include all information about conditions, illnesses, injuries, tests, procedures, treatments, therapies, discharge instructions or life-style choices that may apply to you. You must talk with your health care provider for complete information about your health and treatment options. This information should not be used to decide whether or not to accept your health care providers advice, instructions or recommendations. Only your health care provider has the knowledge and training to provide advice that is right for you.  Copyright   Copyright © 2021 [employer] and its affiliates and/or licensors. All rights reserved.

## 2024-09-30 ENCOUNTER — OFFICE VISIT (OUTPATIENT)
Dept: OBSTETRICS AND GYNECOLOGY | Facility: CLINIC | Age: 59
End: 2024-09-30
Payer: COMMERCIAL

## 2024-09-30 VITALS
BODY MASS INDEX: 23.78 KG/M2 | HEART RATE: 71 BPM | DIASTOLIC BLOOD PRESSURE: 67 MMHG | SYSTOLIC BLOOD PRESSURE: 94 MMHG | WEIGHT: 130 LBS

## 2024-09-30 DIAGNOSIS — Z01.419 WELL WOMAN EXAM WITH ROUTINE GYNECOLOGICAL EXAM: Primary | ICD-10-CM

## 2024-09-30 DIAGNOSIS — N81.4 UTERINE PROLAPSE: ICD-10-CM

## 2024-09-30 DIAGNOSIS — N95.2 ATROPHIC VAGINITIS: ICD-10-CM

## 2024-09-30 PROCEDURE — 3074F SYST BP LT 130 MM HG: CPT | Mod: CPTII,,, | Performed by: OBSTETRICS & GYNECOLOGY

## 2024-09-30 PROCEDURE — 99396 PREV VISIT EST AGE 40-64: CPT | Mod: S$PBB,,, | Performed by: OBSTETRICS & GYNECOLOGY

## 2024-09-30 PROCEDURE — 3078F DIAST BP <80 MM HG: CPT | Mod: CPTII,,, | Performed by: OBSTETRICS & GYNECOLOGY

## 2024-09-30 PROCEDURE — 1159F MED LIST DOCD IN RCRD: CPT | Mod: CPTII,,, | Performed by: OBSTETRICS & GYNECOLOGY

## 2024-09-30 PROCEDURE — 3008F BODY MASS INDEX DOCD: CPT | Mod: CPTII,,, | Performed by: OBSTETRICS & GYNECOLOGY

## 2024-09-30 PROCEDURE — 3044F HG A1C LEVEL LT 7.0%: CPT | Mod: CPTII,,, | Performed by: OBSTETRICS & GYNECOLOGY

## 2024-09-30 RX ORDER — NITROFURANTOIN 25; 75 MG/1; MG/1
100 CAPSULE ORAL 2 TIMES DAILY
COMMUNITY
Start: 2024-09-26

## 2024-09-30 NOTE — PROGRESS NOTES
Subjective:       Patient ID: Shima Wood is a 59 y.o. female.    Chief Complaint:  Well Woman      History of Present Illness  Pt here for gyn annual.  History and past labs reviewed with patient.    Complaints of frequent UTIs recently      Review of Systems  Review of Systems   Constitutional:  Negative for chills and fever.   Respiratory:  Negative for shortness of breath.    Cardiovascular:  Negative for chest pain.   Gastrointestinal:  Negative for abdominal pain, blood in stool, constipation, diarrhea, nausea, vomiting and reflux.   Genitourinary:  Positive for vaginal dryness. Negative for dysmenorrhea, dyspareunia, dysuria, hematuria, hot flashes, menorrhagia, menstrual problem, pelvic pain, vaginal bleeding, vaginal discharge and postcoital bleeding.   Musculoskeletal:  Negative for arthralgias and joint swelling.   Integumentary:  Negative for rash, hair changes, breast mass, nipple discharge and breast skin changes.   Psychiatric/Behavioral:  Negative for depression. The patient is not nervous/anxious.    Breast: Negative for asymmetry, lump, mass, nipple discharge and skin changes          Objective:     Vitals:    09/30/24 1053   BP: 94/67   Pulse: 71   Weight: 59 kg (130 lb)       Physical Exam:   Constitutional: She appears well-developed and well-nourished. No distress.    HENT:   Head: Normocephalic and atraumatic.    Eyes: Conjunctivae and EOM are normal.    Neck: No tracheal deviation present. No thyromegaly present.    Cardiovascular:       Exam reveals no clubbing, no cyanosis and no edema.        Pulmonary/Chest: Effort normal. No respiratory distress.        Abdominal: Soft. She exhibits no distension and no mass. There is no abdominal tenderness. There is no rebound and no guarding. No hernia.     Genitourinary:    Uterus and rectum normal.      Pelvic exam was performed with patient supine.   There is no rash, tenderness, lesion or injury on the right labia. There is no rash,  tenderness, lesion or injury on the left labia. Cervix is normal. Right adnexum displays no mass, no tenderness and no fullness. Left adnexum displays no mass, no tenderness and no fullness. There is erythema in the vagina. Vaginal atrophy noted.                Skin: She is not diaphoretic. No cyanosis. Nails show no clubbing.        breast exam wnl- no nipple dc, skin changes, masses or lymph nodes palpated bilaterally     Assessment:        1. Well woman exam with routine gynecological exam             Plan:         Annual   Pap UTD   MMG ordered    Colonoscopy FOBT card neg   Discussed hyst for uterine prolapse, pt unsure today   Refilled vaginal estrogen   Risk assessment for inherited gyn cancer done   RTC  1 year     Patient was counseled today on current ASCCP pap guidelines, the recommendation for yearly pelvic exams, healthy diet and exercise routines, annual mammograms starting at age 40, and breast self awareness. She is to see her PCP for other health maintenance

## 2024-12-12 ENCOUNTER — DOCUMENTATION ONLY (OUTPATIENT)
Dept: OBSTETRICS AND GYNECOLOGY | Facility: CLINIC | Age: 59
End: 2024-12-12
Payer: COMMERCIAL

## 2025-01-09 ENCOUNTER — TELEPHONE (OUTPATIENT)
Dept: PHARMACY | Facility: CLINIC | Age: 60
End: 2025-01-09
Payer: COMMERCIAL

## 2025-01-09 NOTE — TELEPHONE ENCOUNTER
[facility]/Population Health Chart Review & Patient Outreach Details For Medication Adherence Project    Reason for Outreach Encounter: 3rd Party payor non-compliance report ([employer], [employer], C, etc)  2.  Patient Outreach Method: [place] message  3.   Medication in question: atorvastatin    LAST FILLED: 5/26/24 for 90 day supply  Hyperlipidemia Medications               atorvastatin (LIPITOR) 20 MG tablet Take 1 tablet (20 mg total) by mouth every evening.               4.  Reviewed and or Updates Made To: Patient Chart  5. Outreach Outcomes and/or actions taken: Sent inquiry to patient: Waiting for response.

## 2025-03-03 ENCOUNTER — CLINICAL SUPPORT (OUTPATIENT)
Dept: OBSTETRICS AND GYNECOLOGY | Facility: CLINIC | Age: 60
End: 2025-03-03
Payer: COMMERCIAL

## 2025-03-03 DIAGNOSIS — Z11.4 SCREENING FOR HIV (HUMAN IMMUNODEFICIENCY VIRUS): ICD-10-CM

## 2025-03-03 DIAGNOSIS — E55.9 VITAMIN D DEFICIENCY: ICD-10-CM

## 2025-03-03 DIAGNOSIS — Z13.1 DIABETES MELLITUS SCREENING: ICD-10-CM

## 2025-03-03 DIAGNOSIS — Z00.00 ANNUAL PHYSICAL EXAM: Primary | ICD-10-CM

## 2025-03-03 DIAGNOSIS — Z01.89 ROUTINE LAB DRAW: Primary | ICD-10-CM

## 2025-03-03 DIAGNOSIS — E78.49 OTHER HYPERLIPIDEMIA: ICD-10-CM

## 2025-03-03 DIAGNOSIS — Z13.29 THYROID DISORDER SCREEN: ICD-10-CM

## 2025-03-03 DIAGNOSIS — R35.0 URINE FREQUENCY: ICD-10-CM

## 2025-03-03 LAB
ABS NRBC COUNT: 0 X 10 3/UL (ref 0–0.01)
ABSOLUTE BASOPHIL: 0.06 X 10 3/UL (ref 0–0.22)
ABSOLUTE EOSINOPHIL: 0.08 X 10 3/UL (ref 0.04–0.54)
ABSOLUTE IMMATURE GRAN: 0.01 X 10 3/UL (ref 0–0.04)
ABSOLUTE LYMPHOCYTE: 2.06 X 10 3/UL (ref 0.86–4.75)
ABSOLUTE MONOCYTE: 0.31 X 10 3/UL (ref 0.22–1.08)
ALBUMIN SERPL-MCNC: 4.6 G/DL (ref 3.5–5.2)
ALBUMIN/GLOB SERPL ELPH: 1.8 {RATIO} (ref 1–2.7)
ALP ISOS SERPL LEV INH-CCNC: 68 U/L (ref 35–105)
ALT (SGPT): 27 U/L (ref 0–33)
ANION GAP SERPL CALC-SCNC: 12 MMOL/L (ref 8–17)
AST SERPL-CCNC: 27 U/L (ref 0–32)
BASOPHILS NFR BLD: 1.3 % (ref 0.2–1.2)
BILIRUBIN, TOTAL: 0.31 MG/DL (ref 0–1.2)
BUN/CREAT SERPL: 23.7 (ref 6–20)
CALCIUM SERPL-MCNC: 9.2 MG/DL (ref 8.6–10.2)
CARBON DIOXIDE, CO2: 28 MMOL/L (ref 22–29)
CHLORIDE: 100 MMOL/L (ref 98–107)
CHOLEST SERPL-MCNC: 135 MG/DL (ref 0–150)
CHOLEST SERPL-MSCNC: 250 MG/DL (ref 100–200)
CREAT SERPL-MCNC: 0.6 MG/DL (ref 0.5–0.9)
EOSINOPHIL NFR BLD: 1.7 % (ref 0.7–7)
ESTIMATED AVERAGE GLUCOSE: 121 MG/DL (ref 70–126)
GFR ESTIMATION: 104.63 ML/MIN/1.73M2
GLOBULIN: 2.5 G/DL (ref 1.5–4.5)
GLUCOSE: 96 MG/DL (ref 74–106)
HBA1C MFR BLD: 5.8 % (ref 4–6)
HCT VFR BLD AUTO: 41.2 % (ref 37–47)
HDLC SERPL-MCNC: 67 MG/DL
HGB BLD-MCNC: 13.5 G/DL (ref 12–16)
HIV 1+2 AB+HIV1 P24 AG SERPL QL IA: NONREACTIVE
IMMATURE GRANULOCYTES: 0.2 % (ref 0–0.5)
LDL/HDL RATIO: 2.3 (ref 1–3)
LDLC SERPL CALC-MCNC: 156 MG/DL (ref 0–100)
LYMPHOCYTES NFR BLD: 44.4 % (ref 19.3–53.1)
MCH RBC QN AUTO: 30.8 PG (ref 27–32)
MCHC RBC AUTO-ENTMCNC: 32.8 G/DL (ref 32–36)
MCV RBC AUTO: 93.8 FL (ref 82–100)
MONOCYTES NFR BLD: 6.7 % (ref 4.7–12.5)
NEUTROPHILS # BLD AUTO: 2.12 X 10 3/UL (ref 2.15–7.56)
NEUTROPHILS NFR BLD: 45.7 % (ref 34–71.1)
NUCLEATED RED BLOOD CELLS: 0 /100 WBC (ref 0–0.2)
PLATELET # BLD AUTO: 306 X 10 3/UL (ref 135–400)
POTASSIUM: 3.9 MMOL/L (ref 3.5–5.1)
PROT SNV-MCNC: 7.1 G/DL (ref 6.4–8.3)
RBC # BLD AUTO: 4.39 X 10 6/UL (ref 4.2–5.4)
RDW-SD: 43.8 FL (ref 37–54)
SODIUM: 140 MMOL/L (ref 136–145)
T4, FREE: 1.06 NG/DL (ref 0.93–1.7)
TSH SERPL DL<=0.005 MIU/L-ACNC: 2.66 UIU/ML (ref 0.27–4.2)
UREA NITROGEN (BUN): 14.2 MG/DL (ref 6–20)
VITAMIN D (25OHD): 45.5 NG/ML
WBC # BLD: 4.64 X 10 3/UL (ref 4.3–10.8)

## 2025-03-05 ENCOUNTER — OFFICE VISIT (OUTPATIENT)
Dept: PRIMARY CARE CLINIC | Facility: CLINIC | Age: 60
End: 2025-03-05
Payer: COMMERCIAL

## 2025-03-05 VITALS
DIASTOLIC BLOOD PRESSURE: 67 MMHG | OXYGEN SATURATION: 98 % | TEMPERATURE: 98 F | WEIGHT: 131.81 LBS | HEIGHT: 62 IN | RESPIRATION RATE: 18 BRPM | SYSTOLIC BLOOD PRESSURE: 103 MMHG | HEART RATE: 76 BPM | BODY MASS INDEX: 24.26 KG/M2

## 2025-03-05 DIAGNOSIS — Z78.0 POSTMENOPAUSAL: ICD-10-CM

## 2025-03-05 DIAGNOSIS — E78.2 MIXED HYPERLIPIDEMIA: ICD-10-CM

## 2025-03-05 DIAGNOSIS — R35.0 URINE FREQUENCY: ICD-10-CM

## 2025-03-05 DIAGNOSIS — Z86.0100 HISTORY OF COLON POLYPS: ICD-10-CM

## 2025-03-05 DIAGNOSIS — Z00.00 ANNUAL PHYSICAL EXAM: Primary | ICD-10-CM

## 2025-03-05 DIAGNOSIS — Z12.11 SCREENING FOR COLORECTAL CANCER: ICD-10-CM

## 2025-03-05 DIAGNOSIS — Z12.12 SCREENING FOR COLORECTAL CANCER: ICD-10-CM

## 2025-03-05 DIAGNOSIS — R73.9 HYPERGLYCEMIA: ICD-10-CM

## 2025-03-05 PROCEDURE — 3078F DIAST BP <80 MM HG: CPT | Mod: CPTII,,, | Performed by: NURSE PRACTITIONER

## 2025-03-05 PROCEDURE — 1160F RVW MEDS BY RX/DR IN RCRD: CPT | Mod: CPTII,,, | Performed by: NURSE PRACTITIONER

## 2025-03-05 PROCEDURE — 3008F BODY MASS INDEX DOCD: CPT | Mod: CPTII,,, | Performed by: NURSE PRACTITIONER

## 2025-03-05 PROCEDURE — 3074F SYST BP LT 130 MM HG: CPT | Mod: CPTII,,, | Performed by: NURSE PRACTITIONER

## 2025-03-05 PROCEDURE — 99396 PREV VISIT EST AGE 40-64: CPT | Mod: S$PBB,,, | Performed by: NURSE PRACTITIONER

## 2025-03-05 PROCEDURE — 3044F HG A1C LEVEL LT 7.0%: CPT | Mod: CPTII,,, | Performed by: NURSE PRACTITIONER

## 2025-03-05 PROCEDURE — 1159F MED LIST DOCD IN RCRD: CPT | Mod: CPTII,,, | Performed by: NURSE PRACTITIONER

## 2025-03-05 NOTE — PATIENT INSTRUCTIONS
RTC in 6 months for F/U or sooner if needed.    Keep appts with specialists as scheduled.    Patient Education       Yearly Physical for Adults   About this topic   Most people do not want to be sick. Having a checkup each year with your doctor is one way to help you stay healthy. You may need to see your doctor more or less often. How often you need to go to the doctor depends on your age. Your family and medical history also play a role in how often you need to go to the doctor. Going to see your doctor on a routine basis can help you find problems early or even before they start. This may make it easier to treat or cure your problem.  General   Your doctor will talk about many things during your checkup. Your doctor may ask about:  Your medical and family history.  All the drugs you are taking. Be sure to include all prescription, over the counter, and herbal supplements. Tell the doctor if you have any drug allergy. Bring a list of drugs you take with you.  How you are feeling and if you are having any problems.  Risky behaviors like smoking, drinking alcohol, using illegal drugs, not wearing seatbelts, having unprotected sex, etc.  Your doctor will do a physical exam and may check your:  Height and weight  Blood pressure  Reflexes  Memory  Vision  Hearing  Your doctor may order:  Lab tests  ECG to check your heart rhythm  X-rays  Tests or treatments based on your exam  What lifestyle changes are needed?   Your doctor may suggest you make changes to your lifestyle at this visit. The doctor may talk with you about being more active or lowering stress levels. Ask your doctor what you need to do.  What drugs may be needed?   Your doctor may order drugs or vaccines to protect you from illnesses.  What changes to diet are needed?   Talk to your doctor to see if any changes are needed to your diet.  When do I need to call the doctor?   Call your doctor if you need to learn about any test results. Together you can make  a plan for more care.  Helpful tips   Make a list of questions for your doctor before you go. This will help you remember to ask about any concerns. Write down any answers from your doctor so you can look over them after your visit.   Tell your doctor about any changes in your body or health since your last visit.  Ask your doctor about any screening tests you need.  Where can I learn more?   American Academy of Family Physicians  http://familydoctor.org/familydoctor/en/prevention-wellness/staying-healthy/healthy-living/preventive-services-for-healthy-living.printerview.html   Centers for Disease Control  http://www.cdc.gov/family/checkup/   Last Reviewed Date   2019-04-22  Consumer Information Use and Disclaimer   This information is not specific medical advice and does not replace information you receive from your health care provider. This is only a brief summary of general information. It does NOT include all information about conditions, illnesses, injuries, tests, procedures, treatments, therapies, discharge instructions or life-style choices that may apply to you. You must talk with your health care provider for complete information about your health and treatment options. This information should not be used to decide whether or not to accept your health care providers advice, instructions or recommendations. Only your health care provider has the knowledge and training to provide advice that is right for you.  Copyright   Copyright © 2021 Up[employer]Date, Inc. and its affiliates and/or licensors. All rights reserved.    Patient Education       Low Cholesterol, Saturated Fat, and Trans Fat Diet   About this topic   Cholesterol, saturated fat, and trans fat are in many foods. These may raise your blood cholesterol levels. If your cholesterol is too high, this can cause health problems in your heart, liver, kidneys, and even your eyes. The key to lowering your risk of heart problems is to lower your bad fat  "intake.  Saturated fats and trans fats are the bad fats. These fats clog your arteries and raise your bad cholesterol. Saturated fats and trans fats are solid fats at room temperature. Saturated fats are animal fats. Trans fats are manmade fats. They add flavor to a lot of packaged foods. Staying away from saturated and trans fats will help your heart.  When you do eat foods with fat, make sure they have the good fats. Monounsaturated and polyunsaturated fats are good fats. These fats help raise your good cholesterol and protect your heart.  General   How to Lower Fat and Cholesterol in Your Diet   Read the labels of the foods you buy from the market to find out how much fat is present. Under 5% of total fat on a label means it is "low fat". Over 20% of total fat on a label means it is high fat.  Eat high fiber foods, like soluble fiber. This type of fiber helps lower cholesterol in the body. Choose oatmeal, fruits (like apples), beans, and nuts to get the most soluble fiber.  Eat foods high in omega-3 fatty acids like toshia seeds, walnuts, salmon, tuna, trout, herring, flaxseed, and soybeans. These foods help keep the heart healthy.  Limit your bad fat and oil intake.  Stay away from butter, stick margarine, shortening, lard, and palm and coconut oil. Pick plant-based spreads instead.  Limit mayonnaise, salad dressings, gravies, and sauces, unless it is made from low-fat ingredients.  Limit chocolate.  Do not eat high-fat processed foods like hot dogs, newell, sausage, ham and other luncheon meats high in fat, and some frozen foods. Pick fish, chicken, turkey, and lean meats instead.  Eat more dried beans, lentils, and tofu to get your protein.  Do not eat organ meats, like liver.  Choose nonfat or low-fat milk, yogurt, and cheese.  Use light or fat-free cream cheese and sour cream.  Eat lots of fruits and vegetables.  Pick whole grain breads, cereals, pastas, and rice.  Do not eat snacks that are high in fats like " granola, cookies, pies, pastries, doughnuts, and croissants.  Stay away from deep fried foods.  Help When Cooking   Remove the fat portion of meats and the skin from poultry before cooking.  Bake, broil, grill, poach, or roast poultry, fish, and lean meats.  Drain and throw away the fat that drains out of meat as you cook it.  Try to add little or no fat to foods.  Use olive or canola oil for cooking or baking.  Steam your vegetables.  Use herbs or no-oil marinades to flavor foods.         Who should use this diet?   This diet is for people who are at high risk of getting health problems like heart disease, high blood pressure, diabetes, and others. This diet is also good for all people to follow to keep your heart healthy.  What foods are good to eat?   Foods with good fats are:  Canola, peanut, and olive oil  Safflower, soybean, and corn oil  Walnuts, almonds, cashews, and peanuts  Pumpkin and sunflower seeds  [place] and tuna  Tofu  Soymilk  Avocado  What foods should be limited or avoided?   Stay away from these types of foods that have saturated fats:  Whole fat dairy products like cheese, ice cream, whole milk, and cream  Palm and coconut oils  High-fat meats like beef, lamb, poultry with the skin, newell, and sausage  Butter and lard  Stay away from these types of foods that may have trans fat:  Cookies, cakes, candy, doughnuts, baked goods, muffins, pizza dough, and pie crusts that are packaged  Fried foods  Frozen dinners  Chips and crackers  Microwave popcorn  Stick margarine and vegetable shortenings  Helpful tips   To help stay away from saturated fat:  Pick lean cuts of meat  Take the skin off chicken and turkey or pick skinless  Pick low-fat cheese, milk, and ice cream  Use liquid oils when cooking and baking, such as olive oil and canola oil  To help stay away from trans fat:  Look at your labels. Choose foods with 0% trans fat. Read the ingredient list. Avoid foods with partially hydrogenated oil in  the ingredient list. This means there is trans fat in the product.  Where can I learn more?   American Heart Association   http://www.heart.org/HEARTORG/Conditions/Cholesterol/PreventionTreatmentofHighCholesterol/Know-Your-Fats_[facility]_305628_Article.jsp   Last Reviewed Date   2021-10-05  Consumer Information Use and Disclaimer   This information is not specific medical advice and does not replace information you receive from your health care provider. This is only a brief summary of general information. It does NOT include all information about conditions, illnesses, injuries, tests, procedures, treatments, therapies, discharge instructions or life-style choices that may apply to you. You must talk with your health care provider for complete information about your health and treatment options. This information should not be used to decide whether or not to accept your health care providers advice, instructions or recommendations. Only your health care provider has the knowledge and training to provide advice that is right for you.   Copyright   Copyright © 2021 [employer], Inc. and its affiliates and/or licensors. All rights reserved.

## 2025-03-05 NOTE — PROGRESS NOTES
Subjective:       Patient ID: Shima Wood is a 59 y.o. female.    Chief Complaint: Annual Exam    HPI: Shima is a 59 y.o. female who presents for annual wellness visit. Daughter present  during visit.    Mrs. Wood feels well today with no new issues or concerns.    Annual labs drawn earlier this week and her cholesterol is elevated, LDL is 156 but Mrs. Wood says she did not fast for testing. All of her other lab results are within normal range including her urine.    HLD - chronic, she takes a STATIN daily. She has been trying to eat healthier but does not exercise.    She does take a daily women's vitamin, turmeric, and magnesium.    Mrs. Wood is followed by Dr. Delvalle for GYN care. She has been in menopause for 7 years and she uses estradiol cream which once or twice per week and it helps. She was recently found to have a prolapsed uterus so she may be having a Hysterectomy by Dr. Delvalle in the near future but she is unsure right now. PAP and Mammogram UTD.    Mrs. Wood had a Colonoscopy done in 2022 at [facility] by Dr. quezada which she had some polyps removed and was told to repeat in 3 years so it is due this year, will refer back to Dr. Quezada today.    Denies smoking or drinking.               Past Medical History:   Diagnosis Date    Hyperlipidemia        History reviewed. No pertinent surgical history.    Family History   Problem Relation Name Age of Onset    Diabetes Father Edson Almaguer        Social History[1]    Problem List[2]    Immunization History   Administered Date(s) Administered    COVID-19 MRNA, LN-S PF (MODERNA HALF 0.25 ML DOSE) 04/12/2022    COVID-19, MRNA, LN-S, PF (MODERNA FULL 0.5 ML DOSE) 03/30/2021, 04/27/2021           Review of Systems   Constitutional:  Negative for activity change, appetite change, chills, diaphoresis, fatigue and fever.   HENT:  Negative for congestion, ear pain, sinus pain, tinnitus and trouble swallowing.    Eyes:  Negative for visual disturbance.  "  Respiratory:  Negative for cough, chest tightness, shortness of breath and wheezing.    Cardiovascular:  Negative for chest pain, palpitations and leg swelling.   Gastrointestinal:  Positive for constipation (occasionally). Negative for abdominal distention, abdominal pain, blood in stool, diarrhea, nausea and vomiting.   Endocrine: Negative for cold intolerance, heat intolerance, polydipsia, polyphagia and polyuria.   Genitourinary:  Negative for decreased urine volume, dysuria, frequency, hematuria, pelvic pain, urgency, vaginal bleeding, vaginal discharge and vaginal pain.   Musculoskeletal:  Negative for back pain, gait problem and neck pain.   Skin:  Negative for color change and rash.   Neurological:  Negative for dizziness, syncope, weakness, light-headedness, numbness and headaches.   Hematological:  Negative for adenopathy. Does not bruise/bleed easily.   Psychiatric/Behavioral:  Negative for behavioral problems, confusion and dysphoric mood. The patient is not nervous/anxious.      Objective:     Vitals:    03/05/25 0924   BP: 103/67   BP Location: Left arm   Patient Position: Sitting   Pulse: 76   Resp: 18   Temp: 98.1 °F (36.7 °C)   TempSrc: Oral   SpO2: 98%   Weight: 59.8 kg (131 lb 12.8 oz)   Height: 5' 2" (1.575 m)       Physical Exam  Vitals and nursing note reviewed.   Constitutional:       General: She is not in acute distress.     Appearance: Normal appearance. She is not diaphoretic.   HENT:      Head: Normocephalic and atraumatic.      Nose: Nose normal.      Mouth/Throat:      Mouth: Mucous membranes are moist.      Pharynx: Oropharynx is clear.   Eyes:      Extraocular Movements: Extraocular movements intact.      Conjunctiva/sclera: Conjunctivae normal.      Pupils: Pupils are equal, round, and reactive to light.   Neck:      Thyroid: No thyromegaly or thyroid tenderness.   Cardiovascular:      Rate and Rhythm: Normal rate and regular rhythm.      Pulses: Normal pulses.      Heart sounds: " Normal heart sounds.   Pulmonary:      Effort: Pulmonary effort is normal.      Breath sounds: Normal breath sounds. No stridor. No decreased breath sounds, wheezing, rhonchi or rales.   Abdominal:      General: Bowel sounds are normal. There is no distension.      Palpations: Abdomen is soft.      Tenderness: There is no abdominal tenderness.   Musculoskeletal:         General: No tenderness. Normal range of motion.      Cervical back: Normal range of motion and neck supple.      Right lower leg: No edema.      Left lower leg: No edema.   Lymphadenopathy:      Cervical: No cervical adenopathy.   Skin:     General: Skin is warm and dry.      Capillary Refill: Capillary refill takes less than 2 seconds.      Findings: No rash.   Neurological:      General: No focal deficit present.      Mental Status: She is alert and oriented to person, place, and time.      Cranial Nerves: Cranial nerves 2-12 are intact.      Sensory: Sensation is intact.      Motor: Motor function is intact.      Coordination: Coordination is intact.      Gait: Gait is intact.      Deep Tendon Reflexes: Reflexes are normal and symmetric.   Psychiatric:         Mood and Affect: Mood and affect normal.         Speech: Speech normal.         Behavior: Behavior normal. Behavior is cooperative.         Thought Content: Thought content normal.         Cognition and Memory: Cognition and memory normal.         Judgment: Judgment normal.         Orders Only on 03/03/2025   Component Date Value Ref Range Status    WBC 03/03/2025 4.64  4.3 - 10.8 X 10 3/ul Final    RBC 03/03/2025 4.39  4.2 - 5.4 X 10 6/ul Final    RDW-SD 03/03/2025 43.8  37 - 54 fl Final    Hemoglobin 03/03/2025 13.5  12 - 16 g/dL Final    Hematocrit 03/03/2025 41.2  37 - 47 % Final    MCV 03/03/2025 93.8  82 - 100 fl Final    MCH 03/03/2025 30.8  27 - 32 pg Final    MCHC 03/03/2025 32.8  32 - 36 g/dL Final    Platelets 03/03/2025 306  135 - 400 X 10 3/ul Final    Neutrophils 03/03/2025  45.7  34 - 71.1 % Final    Lymphocytes 03/03/2025 44.4  19.3 - 53.1 % Final    Monocytes 03/03/2025 6.7  4.7 - 12.5 % Final    Eosinophils 03/03/2025 1.7  0.7 - 7.0 % Final    Basophils 03/03/2025 1.3 (H)  0.2 - 1.2 % Final    Neutrophils Absolute 03/03/2025 2.12 (L)  2.15 - 7.56 X 10 3/ul Final    Lymphocytes Absolute 03/03/2025 2.06  0.86 - 4.75 X 10 3/ul Final    Monocytes Absolute 03/03/2025 0.31  0.22 - 1.08 X 10 3/ul Final    Eosinophils Absolute 03/03/2025 0.08  0.04 - 0.54 X 10 3/ul Final    Basophils Absolute 03/03/2025 0.06  0.00 - 0.22 X 10 3/ul Final    Immature Granulocytes Absolute 03/03/2025 0.01  0 - 0.04 X 10 3/ul Final    Immature Granulocytes 03/03/2025 0.2  0 - 0.5 % Final    nRBC# 03/03/2025 0.0  0 - 0.2 /100 WBC Final    nRBC Count Absolute 03/03/2025 0.000  0 - 0.012 x 10 3/ul Final    Glucose 03/03/2025 96  74 - 106 mg/dL Final    BUN 03/03/2025 14.2  6 - 20 mg/dL Final    Creatinine 03/03/2025 0.60  0.50 - 0.90 mg/dL Final    AST 03/03/2025 27  0 - 32 U/L Final    ALT (SGPT) 03/03/2025 27  0 - 33 U/L Final    Alkaline Phosphatase 03/03/2025 68  35 - 105 U/L Final    Calcium 03/03/2025 9.2  8.6 - 10.2 mg/dL Final    Protein, Total 03/03/2025 7.1  6.4 - 8.3 g/dL Final    Albumin 03/03/2025 4.6  3.5 - 5.2 g/dL Final    BILIRUBIN, TOTAL 03/03/2025 0.31  0.00 - 1.20 mg/dL Final    Sodium 03/03/2025 140  136 - 145 mmol/L Final    Potassium 03/03/2025 3.9  3.5 - 5.1 mmol/L Final    Chloride 03/03/2025 100  98 - 107 mmol/L Final    CO2 03/03/2025 28  22 - 29 mmol/L Final    Globulin 03/03/2025 2.5  1.5 - 4.5 g/dL Final    Albumin/Globulin Ratio 03/03/2025 1.8  1.0 - 2.7 Final    BUN/Creatinine Ratio 03/03/2025 23.7 (H)  6 - 20 Final    GFR ESTIMATION 03/03/2025 104.63  >60.00 mL/min/1.73m2 Final    Anion Gap 03/03/2025 12.0  8.0 - 17.0 mmol/L Final    Hemoglobin A1C 03/03/2025 5.8  4.0 - 6.0 % Final    EST AVERAGE GLUCOSE 03/03/2025 121  70 - 126 MG/DL Final    Cholesterol 03/03/2025 250 (H)  100 -  200 mg/dL Final    Triglycerides 03/03/2025 135  0 - 150 mg/dL Final    HDL 03/03/2025 67  >60 mg/dL Final    LDL Cholesterol 03/03/2025 156.0 (H)  0 - 100 mg/dL Final    LDL/HDL Ratio 03/03/2025 2.3  1 - 3 Final    T4, Free 03/03/2025 1.06  0.93 - 1.70 ng/dL Final    TSH 03/03/2025 2.66  0.27 - 4.20 uIU/mL Final    VITAMIN D (25OHD) 03/03/2025 45.5  >30 ng/mL Final    HIV 1/2 Ag/Ab 03/03/2025 NONREACTIVE  NONREACTIVE Final         Assessment:      1. Annual physical exam    2. Postmenopausal    3. Mixed hyperlipidemia    4. Screening for colorectal cancer    5. History of colon polyps    6. Urine frequency    7. Hyperglycemia          Plan:     Annual physical exam  Comments:  Lab results reviewed and discussed in detail with patient's full understanding.    Postmenopausal    Mixed hyperlipidemia  Comments:  continue STATIN daily along with low cholesterol diet and regular exercise  Orders:  -     Lipid Panel; Future; Expected date: 03/05/2025    Screening for colorectal cancer  -     Ambulatory referral/consult to Gastroenterology; Future; Expected date: 03/12/2025    History of colon polyps  -     Ambulatory referral/consult to Gastroenterology; Future; Expected date: 03/12/2025    Urine frequency  -     Urinalysis, Reflex to Urine Culture Urine, Clean Catch; Future; Expected date: 03/05/2025    Hyperglycemia  -     Hemoglobin A1C; Future; Expected date: 03/05/2025  -     Comprehensive Metabolic Panel; Future; Expected date: 03/05/2025         Current Medications[3]    Medications Discontinued During This Encounter   Medication Reason    desloratadine (CLARINEX) 5 mg tablet     triamcinolone acetonide 0.1% (KENALOG) 0.1 % cream     nitrofurantoin, macrocrystal-monohydrate, (MACROBID) 100 MG capsule        Health Maintenance   Topic Date Due    TETANUS VACCINE  Never done    Shingles Vaccine (1 of 2) Never done    Pneumococcal Vaccines (Age 50+) (1 of 1 - PCV) Never done    Influenza Vaccine (1) Never done     COVID-19 Vaccine (4 - 2024-25 season) 09/01/2024    Cervical Cancer Screening  08/04/2025    Mammogram  10/28/2025    Lipid Panel  03/03/2030    Colorectal Cancer Screening  06/24/2030    RSV Vaccine (Age 60+ and Pregnant patients) (1 - 1-dose 75+ series) 06/30/2042    Hepatitis C Screening  Completed    HIV Screening  Completed       Patient Instructions   RTC in 6 months for F/U or sooner if needed.    Keep appts with specialists as scheduled.    Patient Education       Yearly Physical for Adults   About this topic   Most people do not want to be sick. Having a checkup each year with your doctor is one way to help you stay healthy. You may need to see your doctor more or less often. How often you need to go to the doctor depends on your age. Your family and medical history also play a role in how often you need to go to the doctor. Going to see your doctor on a routine basis can help you find problems early or even before they start. This may make it easier to treat or cure your problem.  General   Your doctor will talk about many things during your checkup. Your doctor may ask about:  Your medical and family history.  All the drugs you are taking. Be sure to include all prescription, over the counter, and herbal supplements. Tell the doctor if you have any drug allergy. Bring a list of drugs you take with you.  How you are feeling and if you are having any problems.  Risky behaviors like smoking, drinking alcohol, using illegal drugs, not wearing seatbelts, having unprotected sex, etc.  Your doctor will do a physical exam and may check your:  Height and weight  Blood pressure  Reflexes  Memory  Vision  Hearing  Your doctor may order:  Lab tests  ECG to check your heart rhythm  X-rays  Tests or treatments based on your exam  What lifestyle changes are needed?   Your doctor may suggest you make changes to your lifestyle at this visit. The doctor may talk with you about being more active or lowering stress levels.  Ask your doctor what you need to do.  What drugs may be needed?   Your doctor may order drugs or vaccines to protect you from illnesses.  What changes to diet are needed?   Talk to your doctor to see if any changes are needed to your diet.  When do I need to call the doctor?   Call your doctor if you need to learn about any test results. Together you can make a plan for more care.  Helpful tips   Make a list of questions for your doctor before you go. This will help you remember to ask about any concerns. Write down any answers from your doctor so you can look over them after your visit.   Tell your doctor about any changes in your body or health since your last visit.  Ask your doctor about any screening tests you need.  Where can I learn more?   American Academy of [employer]icians  http://familydoctor.org/familydoctor/en/prevention-wellness/staying-healthy/healthy-living/preventive-services-for-healthy-living.printerview.html   Centers for Disease Control  http://www.cdc.gov/family/checkup/   Last Reviewed Date   2019-04-22  Consumer Information Use and Disclaimer   This information is not specific medical advice and does not replace information you receive from your health care provider. This is only a brief summary of general information. It does NOT include all information about conditions, illnesses, injuries, tests, procedures, treatments, therapies, discharge instructions or life-style choices that may apply to you. You must talk with your health care provider for complete information about your health and treatment options. This information should not be used to decide whether or not to accept your health care providers advice, instructions or recommendations. Only your health care provider has the knowledge and training to provide advice that is right for you.  Copyright   Copyright © 2021 UpToDate, Inc. and its affiliates and/or licensors. All rights reserved.    Patient Education       Low  "Cholesterol, Saturated Fat, and Trans Fat Diet   About this topic   Cholesterol, saturated fat, and trans fat are in many foods. These may raise your blood cholesterol levels. If your cholesterol is too high, this can cause health problems in your heart, liver, kidneys, and even your eyes. The key to lowering your risk of heart problems is to lower your bad fat intake.  Saturated fats and trans fats are the bad fats. These fats clog your arteries and raise your bad cholesterol. Saturated fats and trans fats are solid fats at room temperature. Saturated fats are animal fats. Trans fats are manmade fats. They add flavor to a lot of packaged foods. Staying away from saturated and trans fats will help your heart.  When you do eat foods with fat, make sure they have the good fats. Monounsaturated and polyunsaturated fats are good fats. These fats help raise your good cholesterol and protect your heart.  General   How to Lower Fat and Cholesterol in Your Diet   Read the labels of the foods you buy from the market to find out how much fat is present. Under 5% of total fat on a label means it is "low fat". Over 20% of total fat on a label means it is high fat.  Eat high fiber foods, like soluble fiber. This type of fiber helps lower cholesterol in the body. Choose oatmeal, fruits (like apples), beans, and nuts to get the most soluble fiber.  Eat foods high in omega-3 fatty acids like toshia seeds, walnuts, salmon, tuna, trout, herring, flaxseed, and soybeans. These foods help keep the heart healthy.  Limit your bad fat and oil intake.  Stay away from butter, stick margarine, shortening, lard, and palm and coconut oil. Pick plant-based spreads instead.  Limit mayonnaise, salad dressings, gravies, and sauces, unless it is made from low-fat ingredients.  Limit chocolate.  Do not eat high-fat processed foods like hot dogs, newell, sausage, ham and other luncheon meats high in fat, and some frozen foods. Pick fish, chicken, " turkey, and lean meats instead.  Eat more dried beans, lentils, and tofu to get your protein.  Do not eat organ meats, like liver.  Choose nonfat or low-fat milk, yogurt, and cheese.  Use light or fat-free cream cheese and sour cream.  Eat lots of fruits and vegetables.  Pick whole grain breads, cereals, pastas, and rice.  Do not eat snacks that are high in fats like granola, cookies, pies, pastries, doughnuts, and croissants.  Stay away from deep fried foods.  Help When Cooking   Remove the fat portion of meats and the skin from poultry before cooking.  Bake, broil, grill, poach, or roast poultry, fish, and lean meats.  Drain and throw away the fat that drains out of meat as you cook it.  Try to add little or no fat to foods.  Use olive or canola oil for cooking or baking.  Steam your vegetables.  Use herbs or no-oil marinades to flavor foods.         Who should use this diet?   This diet is for people who are at high risk of getting health problems like heart disease, high blood pressure, diabetes, and others. This diet is also good for all people to follow to keep your heart healthy.  What foods are good to eat?   Foods with good fats are:  Canola, peanut, and olive oil  Safflower, soybean, and corn oil  Walnuts, almonds, cashews, and peanuts  Pumpkin and sunflower seeds  [place] and tuna  Tofu  Soymilk  Avocado  What foods should be limited or avoided?   Stay away from these types of foods that have saturated fats:  Whole fat dairy products like cheese, ice cream, whole milk, and cream  Palm and coconut oils  High-fat meats like beef, lamb, poultry with the skin, newell, and sausage  Butter and lard  Stay away from these types of foods that may have trans fat:  Cookies, cakes, candy, doughnuts, baked goods, muffins, pizza dough, and pie crusts that are packaged  Fried foods  Frozen dinners  Chips and crackers  Microwave popcorn  Stick margarine and vegetable shortenings  Helpful tips   To help stay away from  saturated fat:  Pick lean cuts of meat  Take the skin off chicken and turkey or pick skinless  Pick low-fat cheese, milk, and ice cream  Use liquid oils when cooking and baking, such as olive oil and canola oil  To help stay away from trans fat:  Look at your labels. Choose foods with 0% trans fat. Read the ingredient list. Avoid foods with partially hydrogenated oil in the ingredient list. This means there is trans fat in the product.  Where can I learn more?   American Heart Association   http://www.heart.org/HEARTORG/Conditions/Cholesterol/PreventionTreatmentofHighCholesterol/Know-Your-Fats_[facility]_305628_Article.jsp   Last Reviewed Date   2021-10-05  Consumer Information Use and Disclaimer   This information is not specific medical advice and does not replace information you receive from your health care provider. This is only a brief summary of general information. It does NOT include all information about conditions, illnesses, injuries, tests, procedures, treatments, therapies, discharge instructions or life-style choices that may apply to you. You must talk with your health care provider for complete information about your health and treatment options. This information should not be used to decide whether or not to accept your health care providers advice, instructions or recommendations. Only your health care provider has the knowledge and training to provide advice that is right for you.   Copyright   Copyright © 2021 UpToDate, Inc. and its affiliates and/or licensors. All rights reserved.        Risks, benefits, and alternatives discussed with patient, Patient verbalized understanding of discussed plan of care. Asked patient if any further questions, answered no.    Future Appointments   Date Time Provider Department Center   9/8/2025 11:20 AM Bertha Stein NP [facility] [id number] [facility]   10/6/2025 10:45 AM Rosalva Delvalle MD [facility] [id number] [facility] Stein NP         [1]   Social  History  Tobacco Use    Smoking status: Never    Smokeless tobacco: Never   Substance Use Topics    Alcohol use: Never    Drug use: Never   [2]   Patient Active Problem List  Diagnosis    Other hyperlipidemia    Seasonal allergies    Gastroesophageal reflux disease with esophagitis without hemorrhage    Uterine prolapse   [3]   Current Outpatient Medications   Medication Sig Dispense Refill    atorvastatin (LIPITOR) 20 MG tablet Take 1 tablet (20 mg total) by mouth every evening. 90 tablet 3    conjugated estrogens (PREMARIN) vaginal cream Place 1 g vaginally once daily. 30 g 5     No current facility-administered medications for this visit.

## 2025-07-22 ENCOUNTER — TELEPHONE (OUTPATIENT)
Dept: OBSTETRICS AND GYNECOLOGY | Facility: CLINIC | Age: 60
End: 2025-07-22
Payer: COMMERCIAL

## 2025-07-22 NOTE — TELEPHONE ENCOUNTER
Copied from CRM #[id number]. Topic: Appointments - Appointment Access  >> Jul 22, 2025 11:08 AM Susan wrote:  Type:  Schedule pre op  Appointment Request    Name of Caller:Alexsandra/daughter  She ask please call her to schedule Mrs Ronquillo's pre op  appointment because Mrs Ronquillo is not able to  the phone at this moment  Would the patient rather a call back or a response via [facility]? Call back  Best Call Back Number:  Thanks!

## 2025-07-22 NOTE — TELEPHONE ENCOUNTER
Spoke with patients daughter. She was wanting to schedule her preop appointment for a hyst. Explained that it has been a year since this was discussed and patient was unsure at the time. Advised she needs to be seen for a consult prior to preop appt. Patient verbalized understanding.

## 2025-08-11 ENCOUNTER — OFFICE VISIT (OUTPATIENT)
Dept: OBSTETRICS AND GYNECOLOGY | Facility: CLINIC | Age: 60
End: 2025-08-11
Payer: COMMERCIAL

## 2025-08-11 VITALS
WEIGHT: 126.19 LBS | DIASTOLIC BLOOD PRESSURE: 72 MMHG | HEART RATE: 73 BPM | BODY MASS INDEX: 23.08 KG/M2 | SYSTOLIC BLOOD PRESSURE: 109 MMHG

## 2025-08-11 DIAGNOSIS — N81.4 UTERINE PROLAPSE: Primary | ICD-10-CM

## 2025-08-11 PROCEDURE — 3044F HG A1C LEVEL LT 7.0%: CPT | Mod: CPTII,,, | Performed by: OBSTETRICS & GYNECOLOGY

## 2025-08-11 PROCEDURE — 99214 OFFICE O/P EST MOD 30 MIN: CPT | Mod: S$PBB,,, | Performed by: OBSTETRICS & GYNECOLOGY

## 2025-08-11 PROCEDURE — 3074F SYST BP LT 130 MM HG: CPT | Mod: CPTII,,, | Performed by: OBSTETRICS & GYNECOLOGY

## 2025-08-11 PROCEDURE — 3078F DIAST BP <80 MM HG: CPT | Mod: CPTII,,, | Performed by: OBSTETRICS & GYNECOLOGY

## 2025-08-11 PROCEDURE — 3008F BODY MASS INDEX DOCD: CPT | Mod: CPTII,,, | Performed by: OBSTETRICS & GYNECOLOGY

## 2025-08-11 PROCEDURE — 1159F MED LIST DOCD IN RCRD: CPT | Mod: CPTII,,, | Performed by: OBSTETRICS & GYNECOLOGY

## 2025-08-13 ENCOUNTER — TELEPHONE (OUTPATIENT)
Dept: OBSTETRICS AND GYNECOLOGY | Facility: CLINIC | Age: 60
End: 2025-08-13
Payer: COMMERCIAL